# Patient Record
Sex: MALE | Race: WHITE | URBAN - METROPOLITAN AREA
[De-identification: names, ages, dates, MRNs, and addresses within clinical notes are randomized per-mention and may not be internally consistent; named-entity substitution may affect disease eponyms.]

---

## 2024-01-19 ENCOUNTER — TRANSCRIBE ORDERS (OUTPATIENT)
Dept: ADMINISTRATIVE | Facility: HOSPITAL | Age: 64
End: 2024-01-19

## 2024-01-19 DIAGNOSIS — Z13.6 SCREENING FOR ISCHEMIC HEART DISEASE: Primary | ICD-10-CM

## 2024-02-27 ENCOUNTER — HOSPITAL ENCOUNTER (OUTPATIENT)
Dept: CT IMAGING | Facility: HOSPITAL | Age: 64
Discharge: HOME | End: 2024-02-27
Admitting: STUDENT IN AN ORGANIZED HEALTH CARE EDUCATION/TRAINING PROGRAM

## 2024-02-27 DIAGNOSIS — Z13.6 SCREENING FOR ISCHEMIC HEART DISEASE: ICD-10-CM

## 2024-02-27 PROCEDURE — 75571 CT HRT W/O DYE W/CA TEST: CPT

## 2024-11-01 ENCOUNTER — OFFICE (AMBULATORY)
Dept: URBAN - METROPOLITAN AREA PATHOLOGY 19 | Facility: PATHOLOGY | Age: 64
End: 2024-11-01
Payer: MEDICARE

## 2024-11-01 ENCOUNTER — OFFICE (AMBULATORY)
Age: 64
End: 2024-11-01

## 2024-11-01 ENCOUNTER — ON CAMPUS - OUTPATIENT (AMBULATORY)
Dept: URBAN - METROPOLITAN AREA HOSPITAL 2 | Facility: HOSPITAL | Age: 64
End: 2024-11-01
Payer: MEDICARE

## 2024-11-01 ENCOUNTER — ON CAMPUS - OUTPATIENT (AMBULATORY)
Age: 64
End: 2024-11-01

## 2024-11-01 VITALS
SYSTOLIC BLOOD PRESSURE: 144 MMHG | SYSTOLIC BLOOD PRESSURE: 101 MMHG | HEART RATE: 66 BPM | SYSTOLIC BLOOD PRESSURE: 151 MMHG | DIASTOLIC BLOOD PRESSURE: 70 MMHG | HEART RATE: 69 BPM | DIASTOLIC BLOOD PRESSURE: 106 MMHG | RESPIRATION RATE: 16 BRPM | HEART RATE: 66 BPM | HEART RATE: 73 BPM | SYSTOLIC BLOOD PRESSURE: 155 MMHG | OXYGEN SATURATION: 99 % | HEART RATE: 73 BPM | HEART RATE: 65 BPM | DIASTOLIC BLOOD PRESSURE: 93 MMHG | RESPIRATION RATE: 18 BRPM | DIASTOLIC BLOOD PRESSURE: 92 MMHG | SYSTOLIC BLOOD PRESSURE: 139 MMHG | HEIGHT: 68 IN | OXYGEN SATURATION: 99 % | SYSTOLIC BLOOD PRESSURE: 148 MMHG | SYSTOLIC BLOOD PRESSURE: 101 MMHG | HEART RATE: 72 BPM | SYSTOLIC BLOOD PRESSURE: 148 MMHG | HEIGHT: 68 IN | DIASTOLIC BLOOD PRESSURE: 106 MMHG | DIASTOLIC BLOOD PRESSURE: 93 MMHG | HEART RATE: 71 BPM | HEART RATE: 65 BPM | HEART RATE: 72 BPM | DIASTOLIC BLOOD PRESSURE: 88 MMHG | DIASTOLIC BLOOD PRESSURE: 88 MMHG | WEIGHT: 213 LBS | OXYGEN SATURATION: 97 % | SYSTOLIC BLOOD PRESSURE: 144 MMHG | SYSTOLIC BLOOD PRESSURE: 143 MMHG | TEMPERATURE: 97.5 F | SYSTOLIC BLOOD PRESSURE: 151 MMHG | RESPIRATION RATE: 18 BRPM | SYSTOLIC BLOOD PRESSURE: 155 MMHG | DIASTOLIC BLOOD PRESSURE: 70 MMHG | SYSTOLIC BLOOD PRESSURE: 152 MMHG | SYSTOLIC BLOOD PRESSURE: 152 MMHG | DIASTOLIC BLOOD PRESSURE: 103 MMHG | HEART RATE: 88 BPM | DIASTOLIC BLOOD PRESSURE: 92 MMHG | HEART RATE: 88 BPM | HEART RATE: 71 BPM | OXYGEN SATURATION: 97 % | WEIGHT: 213 LBS | SYSTOLIC BLOOD PRESSURE: 139 MMHG | HEART RATE: 69 BPM | SYSTOLIC BLOOD PRESSURE: 143 MMHG | DIASTOLIC BLOOD PRESSURE: 103 MMHG | OXYGEN SATURATION: 100 % | RESPIRATION RATE: 16 BRPM | TEMPERATURE: 97.5 F | OXYGEN SATURATION: 100 %

## 2024-11-01 DIAGNOSIS — D12.0 BENIGN NEOPLASM OF CECUM: ICD-10-CM

## 2024-11-01 DIAGNOSIS — Z12.11 ENCOUNTER FOR SCREENING FOR MALIGNANT NEOPLASM OF COLON: ICD-10-CM

## 2024-11-01 DIAGNOSIS — K57.30 DIVERTICULOSIS OF LARGE INTESTINE WITHOUT PERFORATION OR ABS: ICD-10-CM

## 2024-11-01 DIAGNOSIS — K63.5 POLYP OF COLON: ICD-10-CM

## 2024-11-01 LAB
GI HISTOLOGY: A. CECUM: (no result)
GI HISTOLOGY: A. CECUM: (no result)
GI HISTOLOGY: B. SIGMOID COLON: (no result)
GI HISTOLOGY: B. SIGMOID COLON: (no result)
GI HISTOLOGY: PDF REPORT: (no result)
GI HISTOLOGY: PDF REPORT: (no result)

## 2024-11-01 PROCEDURE — 45385 COLONOSCOPY W/LESION REMOVAL: CPT | Mod: PT | Performed by: INTERNAL MEDICINE

## 2024-11-01 PROCEDURE — 88305 TISSUE EXAM BY PATHOLOGIST: CPT | Mod: 26 | Performed by: PATHOLOGY

## 2024-11-02 ENCOUNTER — APPOINTMENT (OUTPATIENT)
Dept: CT IMAGING | Facility: HOSPITAL | Age: 64
End: 2024-11-02
Payer: MEDICARE

## 2024-11-02 ENCOUNTER — APPOINTMENT (OUTPATIENT)
Dept: GENERAL RADIOLOGY | Facility: HOSPITAL | Age: 64
End: 2024-11-02
Payer: MEDICARE

## 2024-11-02 ENCOUNTER — HOSPITAL ENCOUNTER (INPATIENT)
Facility: HOSPITAL | Age: 64
LOS: 5 days | Discharge: HOME | End: 2024-11-07
Attending: EMERGENCY MEDICINE
Payer: MEDICARE

## 2024-11-02 DIAGNOSIS — K62.5 RECTAL BLEEDING: ICD-10-CM

## 2024-11-02 DIAGNOSIS — R55 SYNCOPE, UNSPECIFIED SYNCOPE TYPE: Primary | ICD-10-CM

## 2024-11-02 PROBLEM — K91.840: Status: ACTIVE | Noted: 2024-11-02

## 2024-11-02 PROBLEM — M06.9: Status: ACTIVE | Noted: 2024-11-02

## 2024-11-02 LAB
ABO GROUP BLD: NORMAL
ANION GAP SERPL CALCULATED.3IONS-SCNC: 11.5 MMOL/L (ref 5–15)
APTT PPP: 23.3 SECONDS (ref 61–76.5)
BASOPHILS # BLD AUTO: 0.05 10*3/MM3 (ref 0–0.2)
BASOPHILS NFR BLD AUTO: 0.4 % (ref 0–1.5)
BLD GP AB SCN SERPL QL: NEGATIVE
BUN SERPL-MCNC: 23 MG/DL (ref 8–23)
BUN/CREAT SERPL: 18.7 (ref 7–25)
CALCIUM SPEC-SCNC: 8.4 MG/DL (ref 8.6–10.5)
CHLORIDE SERPL-SCNC: 98 MMOL/L (ref 98–107)
CO2 SERPL-SCNC: 24.5 MMOL/L (ref 22–29)
CREAT SERPL-MCNC: 1.23 MG/DL (ref 0.76–1.27)
DEPRECATED RDW RBC AUTO: 42.5 FL (ref 37–54)
EGFRCR SERPLBLD CKD-EPI 2021: 65.2 ML/MIN/1.73
EOSINOPHIL # BLD AUTO: 0.15 10*3/MM3 (ref 0–0.4)
EOSINOPHIL NFR BLD AUTO: 1.1 % (ref 0.3–6.2)
ERYTHROCYTE [DISTWIDTH] IN BLOOD BY AUTOMATED COUNT: 13.2 % (ref 12.3–15.4)
GLUCOSE SERPL-MCNC: 123 MG/DL (ref 65–99)
HCT VFR BLD AUTO: 31.7 % (ref 37.5–51)
HGB BLD-MCNC: 11.1 G/DL (ref 13–17.7)
IMM GRANULOCYTES # BLD AUTO: 0.14 10*3/MM3 (ref 0–0.05)
IMM GRANULOCYTES NFR BLD AUTO: 1 % (ref 0–0.5)
INR PPP: 1.02 (ref 0.93–1.1)
LYMPHOCYTES # BLD AUTO: 2.58 10*3/MM3 (ref 0.7–3.1)
LYMPHOCYTES NFR BLD AUTO: 18.6 % (ref 19.6–45.3)
MCH RBC QN AUTO: 30.9 PG (ref 26.6–33)
MCHC RBC AUTO-ENTMCNC: 35 G/DL (ref 31.5–35.7)
MCV RBC AUTO: 88.3 FL (ref 79–97)
MONOCYTES # BLD AUTO: 0.95 10*3/MM3 (ref 0.1–0.9)
MONOCYTES NFR BLD AUTO: 6.8 % (ref 5–12)
NEUTROPHILS NFR BLD AUTO: 10.01 10*3/MM3 (ref 1.7–7)
NEUTROPHILS NFR BLD AUTO: 72.1 % (ref 42.7–76)
NRBC BLD AUTO-RTO: 0 /100 WBC (ref 0–0.2)
PLATELET # BLD AUTO: 315 10*3/MM3 (ref 140–450)
PMV BLD AUTO: 9.9 FL (ref 6–12)
POTASSIUM SERPL-SCNC: 3.6 MMOL/L (ref 3.5–5.2)
PROTHROMBIN TIME: 11.1 SECONDS (ref 9.6–11.7)
RBC # BLD AUTO: 3.59 10*6/MM3 (ref 4.14–5.8)
RH BLD: POSITIVE
SODIUM SERPL-SCNC: 134 MMOL/L (ref 136–145)
T&S EXPIRATION DATE: NORMAL
TROPONIN T SERPL HS-MCNC: 9 NG/L
WBC NRBC COR # BLD AUTO: 13.88 10*3/MM3 (ref 3.4–10.8)

## 2024-11-02 PROCEDURE — 74176 CT ABD & PELVIS W/O CONTRAST: CPT

## 2024-11-02 PROCEDURE — 80048 BASIC METABOLIC PNL TOTAL CA: CPT | Performed by: EMERGENCY MEDICINE

## 2024-11-02 PROCEDURE — 86901 BLOOD TYPING SEROLOGIC RH(D): CPT

## 2024-11-02 PROCEDURE — 85610 PROTHROMBIN TIME: CPT | Performed by: EMERGENCY MEDICINE

## 2024-11-02 PROCEDURE — 85025 COMPLETE CBC W/AUTO DIFF WBC: CPT | Performed by: EMERGENCY MEDICINE

## 2024-11-02 PROCEDURE — 86901 BLOOD TYPING SEROLOGIC RH(D): CPT | Performed by: EMERGENCY MEDICINE

## 2024-11-02 PROCEDURE — 84484 ASSAY OF TROPONIN QUANT: CPT | Performed by: EMERGENCY MEDICINE

## 2024-11-02 PROCEDURE — 36415 COLL VENOUS BLD VENIPUNCTURE: CPT

## 2024-11-02 PROCEDURE — 86850 RBC ANTIBODY SCREEN: CPT | Performed by: EMERGENCY MEDICINE

## 2024-11-02 PROCEDURE — 93005 ELECTROCARDIOGRAM TRACING: CPT

## 2024-11-02 PROCEDURE — 99285 EMERGENCY DEPT VISIT HI MDM: CPT

## 2024-11-02 PROCEDURE — 85027 COMPLETE CBC AUTOMATED: CPT

## 2024-11-02 PROCEDURE — 93005 ELECTROCARDIOGRAM TRACING: CPT | Performed by: EMERGENCY MEDICINE

## 2024-11-02 PROCEDURE — 85730 THROMBOPLASTIN TIME PARTIAL: CPT | Performed by: EMERGENCY MEDICINE

## 2024-11-02 PROCEDURE — 86923 COMPATIBILITY TEST ELECTRIC: CPT

## 2024-11-02 PROCEDURE — 86900 BLOOD TYPING SEROLOGIC ABO: CPT

## 2024-11-02 PROCEDURE — 25810000003 SODIUM CHLORIDE 0.9 % SOLUTION

## 2024-11-02 PROCEDURE — 71045 X-RAY EXAM CHEST 1 VIEW: CPT

## 2024-11-02 PROCEDURE — 86900 BLOOD TYPING SEROLOGIC ABO: CPT | Performed by: EMERGENCY MEDICINE

## 2024-11-02 RX ORDER — SODIUM CHLORIDE 9 MG/ML
150 INJECTION, SOLUTION INTRAVENOUS CONTINUOUS
Status: DISPENSED | OUTPATIENT
Start: 2024-11-02 | End: 2024-11-03

## 2024-11-02 RX ORDER — IBUPROFEN 600 MG/1
1 TABLET ORAL
Status: DISCONTINUED | OUTPATIENT
Start: 2024-11-02 | End: 2024-11-07 | Stop reason: HOSPADM

## 2024-11-02 RX ORDER — SODIUM CHLORIDE 0.9 % (FLUSH) 0.9 %
10 SYRINGE (ML) INJECTION AS NEEDED
Status: DISCONTINUED | OUTPATIENT
Start: 2024-11-02 | End: 2024-11-07 | Stop reason: HOSPADM

## 2024-11-02 RX ORDER — SODIUM CHLORIDE 0.9 % (FLUSH) 0.9 %
10 SYRINGE (ML) INJECTION EVERY 12 HOURS SCHEDULED
Status: DISCONTINUED | OUTPATIENT
Start: 2024-11-02 | End: 2024-11-07 | Stop reason: HOSPADM

## 2024-11-02 RX ORDER — ACETAMINOPHEN 325 MG/1
650 TABLET ORAL EVERY 4 HOURS PRN
Status: DISCONTINUED | OUTPATIENT
Start: 2024-11-02 | End: 2024-11-07 | Stop reason: HOSPADM

## 2024-11-02 RX ORDER — DEXTROSE MONOHYDRATE 25 G/50ML
25 INJECTION, SOLUTION INTRAVENOUS
Status: DISCONTINUED | OUTPATIENT
Start: 2024-11-02 | End: 2024-11-07 | Stop reason: HOSPADM

## 2024-11-02 RX ORDER — NICOTINE POLACRILEX 4 MG
15 LOZENGE BUCCAL
Status: DISCONTINUED | OUTPATIENT
Start: 2024-11-02 | End: 2024-11-07 | Stop reason: HOSPADM

## 2024-11-02 RX ORDER — NITROGLYCERIN 0.4 MG/1
0.4 TABLET SUBLINGUAL
Status: DISCONTINUED | OUTPATIENT
Start: 2024-11-02 | End: 2024-11-07 | Stop reason: HOSPADM

## 2024-11-02 RX ORDER — SODIUM CHLORIDE 9 MG/ML
40 INJECTION, SOLUTION INTRAVENOUS AS NEEDED
Status: DISCONTINUED | OUTPATIENT
Start: 2024-11-02 | End: 2024-11-07 | Stop reason: HOSPADM

## 2024-11-02 RX ORDER — PANTOPRAZOLE SODIUM 40 MG/10ML
40 INJECTION, POWDER, LYOPHILIZED, FOR SOLUTION INTRAVENOUS EVERY 12 HOURS SCHEDULED
Status: DISCONTINUED | OUTPATIENT
Start: 2024-11-02 | End: 2024-11-06

## 2024-11-02 RX ORDER — ACETAMINOPHEN 160 MG/5ML
650 SOLUTION ORAL EVERY 4 HOURS PRN
Status: DISCONTINUED | OUTPATIENT
Start: 2024-11-02 | End: 2024-11-07 | Stop reason: HOSPADM

## 2024-11-02 RX ORDER — ACETAMINOPHEN 650 MG/1
650 SUPPOSITORY RECTAL EVERY 4 HOURS PRN
Status: DISCONTINUED | OUTPATIENT
Start: 2024-11-02 | End: 2024-11-07 | Stop reason: HOSPADM

## 2024-11-02 RX ADMIN — SODIUM CHLORIDE 1000 ML: 0.9 INJECTION, SOLUTION INTRAVENOUS at 21:12

## 2024-11-02 RX ADMIN — PANTOPRAZOLE SODIUM 40 MG: 40 INJECTION, POWDER, FOR SOLUTION INTRAVENOUS at 22:33

## 2024-11-02 RX ADMIN — Medication 10 ML: at 22:34

## 2024-11-02 RX ADMIN — SODIUM CHLORIDE 150 ML/HR: 9 INJECTION, SOLUTION INTRAVENOUS at 21:53

## 2024-11-02 NOTE — Clinical Note
Level of Care: Telemetry [5]   Admitting Physician: ADENIKE ZAPIEN [[id number]]   Attending Physician: ADENIKE ZAPIEN [[id number]]

## 2024-11-02 NOTE — ED NOTES
"Chief Complaint   Patient presents with    Syncope     Pt states that he's here r/t bleeding from rectum. Pt states that he had a colonoscopy yesterday around 11:30am and is experiencing \"a lot of bleeding\". Pt's wife also states that pt experienced once episode of where pt was lightheaded, dizzy and sweaty.   "

## 2024-11-02 NOTE — ED PROVIDER NOTES
"Subjective   History of Present Illness  Chief complaint: Syncope    63-year-old male presents after syncopal episode.  Patient states he was sitting at his table at home when he started feeling woozy and hot.  The next thing he knew he was in the ambulance.  Family states he became diaphoretic and was not responding appropriately.  He denies any chest pain or shortness of breath.  He denies any focal numbness or weakness.  He had a colonoscopy yesterday and had some polyps removed.  He states he was told to expect some bleeding because of this but he states he has been bleeding quite a bit from his rectum since the procedure.  He does not take any blood thinners.  He reports minimal abdominal pain.    History provided by:  Patient      Review of Systems   Constitutional:  Negative for fever.   HENT:  Negative for congestion.    Respiratory:  Negative for cough and shortness of breath.    Cardiovascular:  Negative for chest pain.   Gastrointestinal:  Positive for abdominal pain and blood in stool. Negative for diarrhea and vomiting.   Musculoskeletal:  Negative for back pain.   Neurological:  Positive for syncope. Negative for headaches.   Psychiatric/Behavioral:  Negative for confusion.        No past medical history on file.    No Known Allergies    No past surgical history on file.    No family history on file.    Social History     Socioeconomic History    Marital status:        /81   Pulse 74   Temp 98.2 °F (36.8 °C)   Resp 16   Ht 177.8 cm (70\")   Wt 97.5 kg (215 lb)   SpO2 99%   BMI 30.85 kg/m²       Objective   Physical Exam  Vitals and nursing note reviewed.   Constitutional:       Appearance: Normal appearance.   HENT:      Head: Normocephalic and atraumatic.      Mouth/Throat:      Mouth: Mucous membranes are moist.   Cardiovascular:      Rate and Rhythm: Normal rate and regular rhythm.      Heart sounds: Normal heart sounds.   Pulmonary:      Effort: Pulmonary effort is normal. No " respiratory distress.      Breath sounds: Normal breath sounds.   Abdominal:      Palpations: Abdomen is soft.      Tenderness: There is no abdominal tenderness.   Musculoskeletal:         General: No deformity or signs of injury.   Skin:     General: Skin is warm and dry.   Neurological:      General: No focal deficit present.      Mental Status: He is alert and oriented to person, place, and time.         Procedures           ED Course      My interpretation of EKG shows sinus rhythm, rate of 82, left bundle branch block, I have no EKG for comparison                             Results for orders placed or performed during the hospital encounter of 11/02/24   ECG 12 Lead Syncope    Collection Time: 11/02/24  6:08 PM   Result Value Ref Range    QT Interval 431 ms    QTC Interval 504 ms   Basic Metabolic Panel    Collection Time: 11/02/24  6:52 PM    Specimen: Blood   Result Value Ref Range    Glucose 123 (H) 65 - 99 mg/dL    BUN 23 8 - 23 mg/dL    Creatinine 1.23 0.76 - 1.27 mg/dL    Sodium 134 (L) 136 - 145 mmol/L    Potassium 3.6 3.5 - 5.2 mmol/L    Chloride 98 98 - 107 mmol/L    CO2 24.5 22.0 - 29.0 mmol/L    Calcium 8.4 (L) 8.6 - 10.5 mg/dL    BUN/Creatinine Ratio 18.7 7.0 - 25.0    Anion Gap 11.5 5.0 - 15.0 mmol/L    eGFR 65.2 >60.0 mL/min/1.73   Protime-INR    Collection Time: 11/02/24  6:52 PM    Specimen: Blood   Result Value Ref Range    Protime 11.1 9.6 - 11.7 Seconds    INR 1.02 0.93 - 1.10   aPTT    Collection Time: 11/02/24  6:52 PM    Specimen: Blood   Result Value Ref Range    PTT 23.3 (L) 61.0 - 76.5 seconds   Single High Sensitivity Troponin T    Collection Time: 11/02/24  6:52 PM    Specimen: Blood   Result Value Ref Range    HS Troponin T 9 <22 ng/L   CBC Auto Differential    Collection Time: 11/02/24  6:52 PM    Specimen: Blood   Result Value Ref Range    WBC 13.88 (H) 3.40 - 10.80 10*3/mm3    RBC 3.59 (L) 4.14 - 5.80 10*6/mm3    Hemoglobin 11.1 (L) 13.0 - 17.7 g/dL    Hematocrit 31.7 (L) 37.5 -  51.0 %    MCV 88.3 79.0 - 97.0 fL    MCH 30.9 26.6 - 33.0 pg    MCHC 35.0 31.5 - 35.7 g/dL    RDW 13.2 12.3 - 15.4 %    RDW-SD 42.5 37.0 - 54.0 fl    MPV 9.9 6.0 - 12.0 fL    Platelets 315 140 - 450 10*3/mm3    Neutrophil % 72.1 42.7 - 76.0 %    Lymphocyte % 18.6 (L) 19.6 - 45.3 %    Monocyte % 6.8 5.0 - 12.0 %    Eosinophil % 1.1 0.3 - 6.2 %    Basophil % 0.4 0.0 - 1.5 %    Immature Grans % 1.0 (H) 0.0 - 0.5 %    Neutrophils, Absolute 10.01 (H) 1.70 - 7.00 10*3/mm3    Lymphocytes, Absolute 2.58 0.70 - 3.10 10*3/mm3    Monocytes, Absolute 0.95 (H) 0.10 - 0.90 10*3/mm3    Eosinophils, Absolute 0.15 0.00 - 0.40 10*3/mm3    Basophils, Absolute 0.05 0.00 - 0.20 10*3/mm3    Immature Grans, Absolute 0.14 (H) 0.00 - 0.05 10*3/mm3    nRBC 0.0 0.0 - 0.2 /100 WBC     XR Chest 1 View    Result Date: 11/2/2024  Impression: No active pulmonary process. Electronically Signed: Joshua Perez MD  11/2/2024 7:18 PM EDT  Workstation ID: [id number]               Medical Decision Making    Patient had the above valuation.  Results were discussed with the patient.  My interpretation of chest x-ray shows no infiltrate or effusion.  EKG shows left bundle branch block.  There is no previous EKG for comparison.  Patient is having no chest pain.  Troponin is negative.  White blood cell count is 13.88.  Hemoglobin is 11.1.  This has trended down from 3 months ago when it was 14.0.  BMP is unremarkable.  Patient has remained hemodynamically stable in the emergency room.  I discussed with the hospitalist and the patient will be admitted for further evaluation and management.  Syncope      Final diagnoses:   Syncope, unspecified syncope type   Rectal bleeding       ED Disposition  ED Disposition       ED Disposition   Decision to Admit    Condition   --    Comment   Level of Care: Telemetry [5]   Admitting Physician: ADENIKE ZAPIEN [[id number]]   Attending Physician: ADENIKE ZAPIEN [[id number]]                 No follow-up provider specified.        Medication List      No changes were made to your prescriptions during this visit.            William Combs MD  11/1960

## 2024-11-03 ENCOUNTER — INPATIENT HOSPITAL (AMBULATORY)
Age: 64
End: 2024-11-03
Payer: COMMERCIAL

## 2024-11-03 ENCOUNTER — ANESTHESIA EVENT (OUTPATIENT)
Dept: GASTROENTEROLOGY | Facility: HOSPITAL | Age: 64
End: 2024-11-03
Payer: MEDICARE

## 2024-11-03 ENCOUNTER — INPATIENT HOSPITAL (AMBULATORY)
Dept: URBAN - METROPOLITAN AREA HOSPITAL 84 | Facility: HOSPITAL | Age: 64
End: 2024-11-03
Payer: COMMERCIAL

## 2024-11-03 ENCOUNTER — ANESTHESIA (OUTPATIENT)
Dept: GASTROENTEROLOGY | Facility: HOSPITAL | Age: 64
End: 2024-11-03
Payer: MEDICARE

## 2024-11-03 VITALS — DIASTOLIC BLOOD PRESSURE: 64 MMHG | SYSTOLIC BLOOD PRESSURE: 117 MMHG | OXYGEN SATURATION: 99 % | HEART RATE: 101 BPM

## 2024-11-03 DIAGNOSIS — D64.9 ANEMIA, UNSPECIFIED: ICD-10-CM

## 2024-11-03 DIAGNOSIS — K57.30 DIVERTICULOSIS OF LARGE INTESTINE WITHOUT PERFORATION OR ABS: ICD-10-CM

## 2024-11-03 DIAGNOSIS — K63.3 ULCER OF INTESTINE: ICD-10-CM

## 2024-11-03 DIAGNOSIS — K92.2 GASTROINTESTINAL HEMORRHAGE, UNSPECIFIED: ICD-10-CM

## 2024-11-03 LAB
ALBUMIN SERPL-MCNC: 3.4 G/DL (ref 3.5–5.2)
ALBUMIN/GLOB SERPL: 2.1 G/DL
ALP SERPL-CCNC: 48 U/L (ref 39–117)
ALT SERPL W P-5'-P-CCNC: 17 U/L (ref 1–41)
ANION GAP SERPL CALCULATED.3IONS-SCNC: 8.4 MMOL/L (ref 5–15)
ARTERIAL PATENCY WRIST A: POSITIVE
AST SERPL-CCNC: 18 U/L (ref 1–40)
ATMOSPHERIC PRESS: ABNORMAL MM[HG]
BASE EXCESS BLDA CALC-SCNC: -5.1 MMOL/L (ref 0–3)
BASOPHILS # BLD AUTO: 0.02 10*3/MM3 (ref 0–0.2)
BASOPHILS NFR BLD AUTO: 0.1 % (ref 0–1.5)
BASOPHILS NFR BLD AUTO: 0.2 % (ref 0–1.5)
BASOPHILS NFR BLD AUTO: 0.2 % (ref 0–1.5)
BDY SITE: ABNORMAL
BILIRUB SERPL-MCNC: 0.9 MG/DL (ref 0–1.2)
BUN SERPL-MCNC: 23 MG/DL (ref 8–23)
BUN/CREAT SERPL: 20.7 (ref 7–25)
CA-I SERPL ISE-MCNC: 1.11 MMOL/L (ref 1.15–1.3)
CALCIUM SPEC-SCNC: 8.8 MG/DL (ref 8.6–10.5)
CHLORIDE SERPL-SCNC: 107 MMOL/L (ref 98–107)
CO2 BLDA-SCNC: 20.6 MMOL/L (ref 22–29)
CO2 SERPL-SCNC: 26.6 MMOL/L (ref 22–29)
CREAT SERPL-MCNC: 1.11 MG/DL (ref 0.76–1.27)
D-LACTATE SERPL-SCNC: 1.5 MMOL/L (ref 0.2–2)
DEPRECATED RDW RBC AUTO: 43.5 FL (ref 37–54)
DEPRECATED RDW RBC AUTO: 43.7 FL (ref 37–54)
DEPRECATED RDW RBC AUTO: 44.8 FL (ref 37–54)
DEPRECATED RDW RBC AUTO: 46.5 FL (ref 37–54)
DEPRECATED RDW RBC AUTO: 46.6 FL (ref 37–54)
EGFRCR SERPLBLD CKD-EPI 2021: 73.7 ML/MIN/1.73
EOSINOPHIL # BLD AUTO: 0.01 10*3/MM3 (ref 0–0.4)
EOSINOPHIL # BLD AUTO: 0.02 10*3/MM3 (ref 0–0.4)
EOSINOPHIL # BLD AUTO: 0.02 10*3/MM3 (ref 0–0.4)
EOSINOPHIL NFR BLD AUTO: 0.1 % (ref 0.3–6.2)
EOSINOPHIL NFR BLD AUTO: 0.2 % (ref 0.3–6.2)
EOSINOPHIL NFR BLD AUTO: 0.2 % (ref 0.3–6.2)
ERYTHROCYTE [DISTWIDTH] IN BLOOD BY AUTOMATED COUNT: 13.3 % (ref 12.3–15.4)
ERYTHROCYTE [DISTWIDTH] IN BLOOD BY AUTOMATED COUNT: 13.5 % (ref 12.3–15.4)
ERYTHROCYTE [DISTWIDTH] IN BLOOD BY AUTOMATED COUNT: 13.6 % (ref 12.3–15.4)
ERYTHROCYTE [DISTWIDTH] IN BLOOD BY AUTOMATED COUNT: 13.8 % (ref 12.3–15.4)
ERYTHROCYTE [DISTWIDTH] IN BLOOD BY AUTOMATED COUNT: 13.8 % (ref 12.3–15.4)
FIBRINOGEN PPP-MCNC: 195 MG/DL (ref 210–450)
GLOBULIN UR ELPH-MCNC: 1.6 GM/DL
GLUCOSE BLDC GLUCOMTR-MCNC: 132 MG/DL (ref 70–105)
GLUCOSE BLDC GLUCOMTR-MCNC: 211 MG/DL (ref 74–100)
GLUCOSE SERPL-MCNC: 118 MG/DL (ref 65–99)
HCO3 BLDA-SCNC: 19.6 MMOL/L (ref 21–28)
HCT VFR BLD AUTO: 20.2 % (ref 37.5–51)
HCT VFR BLD AUTO: 21.3 % (ref 37.5–51)
HCT VFR BLD AUTO: 21.5 % (ref 37.5–51)
HCT VFR BLD AUTO: 27.6 % (ref 37.5–51)
HCT VFR BLD AUTO: 28.7 % (ref 37.5–51)
HCT VFR BLD AUTO: 29.9 % (ref 37.5–51)
HCT VFR BLDA CALC: 37 % (ref 38–51)
HEMODILUTION: NO
HGB BLD-MCNC: 10.1 G/DL (ref 13–17.7)
HGB BLD-MCNC: 10.4 G/DL (ref 13–17.7)
HGB BLD-MCNC: 6.8 G/DL (ref 13–17.7)
HGB BLD-MCNC: 7.1 G/DL (ref 13–17.7)
HGB BLD-MCNC: 7.1 G/DL (ref 13–17.7)
HGB BLD-MCNC: 9.4 G/DL (ref 13–17.7)
HGB BLDA-MCNC: 12.6 G/DL (ref 12–17)
IMM GRANULOCYTES # BLD AUTO: 0.14 10*3/MM3 (ref 0–0.05)
IMM GRANULOCYTES # BLD AUTO: 0.16 10*3/MM3 (ref 0–0.05)
IMM GRANULOCYTES # BLD AUTO: 0.23 10*3/MM3 (ref 0–0.05)
IMM GRANULOCYTES NFR BLD AUTO: 1.1 % (ref 0–0.5)
IMM GRANULOCYTES NFR BLD AUTO: 1.2 % (ref 0–0.5)
IMM GRANULOCYTES NFR BLD AUTO: 1.4 % (ref 0–0.5)
INHALED O2 CONCENTRATION: 21 %
IRON 24H UR-MRATE: 203 MCG/DL (ref 59–158)
IRON SATN MFR SERPL: 66 % (ref 20–50)
LYMPHOCYTES # BLD AUTO: 1.62 10*3/MM3 (ref 0.7–3.1)
LYMPHOCYTES # BLD AUTO: 1.65 10*3/MM3 (ref 0.7–3.1)
LYMPHOCYTES # BLD AUTO: 1.71 10*3/MM3 (ref 0.7–3.1)
LYMPHOCYTES NFR BLD AUTO: 12.5 % (ref 19.6–45.3)
LYMPHOCYTES NFR BLD AUTO: 13.2 % (ref 19.6–45.3)
LYMPHOCYTES NFR BLD AUTO: 9.8 % (ref 19.6–45.3)
MAGNESIUM SERPL-MCNC: 1.9 MG/DL (ref 1.6–2.4)
MCH RBC QN AUTO: 30.5 PG (ref 26.6–33)
MCH RBC QN AUTO: 30.5 PG (ref 26.6–33)
MCH RBC QN AUTO: 30.7 PG (ref 26.6–33)
MCH RBC QN AUTO: 30.8 PG (ref 26.6–33)
MCH RBC QN AUTO: 31.1 PG (ref 26.6–33)
MCHC RBC AUTO-ENTMCNC: 33 G/DL (ref 31.5–35.7)
MCHC RBC AUTO-ENTMCNC: 33.3 G/DL (ref 31.5–35.7)
MCHC RBC AUTO-ENTMCNC: 33.7 G/DL (ref 31.5–35.7)
MCHC RBC AUTO-ENTMCNC: 34.1 G/DL (ref 31.5–35.7)
MCHC RBC AUTO-ENTMCNC: 35.2 G/DL (ref 31.5–35.7)
MCV RBC AUTO: 86.7 FL (ref 79–97)
MCV RBC AUTO: 90.2 FL (ref 79–97)
MCV RBC AUTO: 91.4 FL (ref 79–97)
MCV RBC AUTO: 92.3 FL (ref 79–97)
MCV RBC AUTO: 93.4 FL (ref 79–97)
MODALITY: ABNORMAL
MONOCYTES # BLD AUTO: 0.6 10*3/MM3 (ref 0.1–0.9)
MONOCYTES # BLD AUTO: 0.63 10*3/MM3 (ref 0.1–0.9)
MONOCYTES # BLD AUTO: 1.38 10*3/MM3 (ref 0.1–0.9)
MONOCYTES NFR BLD AUTO: 4.5 % (ref 5–12)
MONOCYTES NFR BLD AUTO: 4.8 % (ref 5–12)
MONOCYTES NFR BLD AUTO: 8.3 % (ref 5–12)
NEUTROPHILS NFR BLD AUTO: 10.47 10*3/MM3 (ref 1.7–7)
NEUTROPHILS NFR BLD AUTO: 10.78 10*3/MM3 (ref 1.7–7)
NEUTROPHILS NFR BLD AUTO: 13.29 10*3/MM3 (ref 1.7–7)
NEUTROPHILS NFR BLD AUTO: 80.3 % (ref 42.7–76)
NEUTROPHILS NFR BLD AUTO: 80.5 % (ref 42.7–76)
NEUTROPHILS NFR BLD AUTO: 81.4 % (ref 42.7–76)
NRBC BLD AUTO-RTO: 0 /100 WBC (ref 0–0.2)
PCO2 BLDA: 34.4 MM HG (ref 35–48)
PH BLDA: 7.36 PH UNITS (ref 7.35–7.45)
PHOSPHATE SERPL-MCNC: 2.5 MG/DL (ref 2.5–4.5)
PLATELET # BLD AUTO: 155 10*3/MM3 (ref 140–450)
PLATELET # BLD AUTO: 252 10*3/MM3 (ref 140–450)
PLATELET # BLD AUTO: 265 10*3/MM3 (ref 140–450)
PLATELET # BLD AUTO: 270 10*3/MM3 (ref 140–450)
PLATELET # BLD AUTO: 323 10*3/MM3 (ref 140–450)
PMV BLD AUTO: 10.1 FL (ref 6–12)
PMV BLD AUTO: 10.4 FL (ref 6–12)
PMV BLD AUTO: 10.9 FL (ref 6–12)
PMV BLD AUTO: 11.1 FL (ref 6–12)
PMV BLD AUTO: 9.8 FL (ref 6–12)
PO2 BLD: 378 MM[HG] (ref 0–500)
PO2 BLDA: 79.4 MM HG (ref 83–108)
POTASSIUM SERPL-SCNC: 3.8 MMOL/L (ref 3.5–5.2)
PROT SERPL-MCNC: 5 G/DL (ref 6–8.5)
QT INTERVAL: 431 MS
QTC INTERVAL: 504 MS
RBC # BLD AUTO: 2.21 10*6/MM3 (ref 4.14–5.8)
RBC # BLD AUTO: 2.28 10*6/MM3 (ref 4.14–5.8)
RBC # BLD AUTO: 2.33 10*6/MM3 (ref 4.14–5.8)
RBC # BLD AUTO: 3.06 10*6/MM3 (ref 4.14–5.8)
RBC # BLD AUTO: 3.31 10*6/MM3 (ref 4.14–5.8)
SAO2 % BLDCOA: 95.3 % (ref 94–98)
SODIUM SERPL-SCNC: 142 MMOL/L (ref 136–145)
TIBC SERPL-MCNC: 308 MCG/DL (ref 298–536)
TRANSFERRIN SERPL-MCNC: 207 MG/DL (ref 200–360)
WBC NRBC COR # BLD AUTO: 12.92 10*3/MM3 (ref 3.4–10.8)
WBC NRBC COR # BLD AUTO: 12.99 10*3/MM3 (ref 3.4–10.8)
WBC NRBC COR # BLD AUTO: 13.23 10*3/MM3 (ref 3.4–10.8)
WBC NRBC COR # BLD AUTO: 15 10*3/MM3 (ref 3.4–10.8)
WBC NRBC COR # BLD AUTO: 16.55 10*3/MM3 (ref 3.4–10.8)

## 2024-11-03 PROCEDURE — P9100 PATHOGEN TEST FOR PLATELETS: HCPCS

## 2024-11-03 PROCEDURE — 82330 ASSAY OF CALCIUM: CPT | Performed by: NURSE PRACTITIONER

## 2024-11-03 PROCEDURE — 45382 COLONOSCOPY W/CONTROL BLEED: CPT | Performed by: INTERNAL MEDICINE

## 2024-11-03 PROCEDURE — 0DJD8ZZ INSPECTION OF LOWER INTESTINAL TRACT, VIA NATURAL OR ARTIFICIAL OPENING ENDOSCOPIC: ICD-10-PCS | Performed by: INTERNAL MEDICINE

## 2024-11-03 PROCEDURE — 86927 PLASMA FRESH FROZEN: CPT

## 2024-11-03 PROCEDURE — P9059 PLASMA, FRZ BETWEEN 8-24HOUR: HCPCS

## 2024-11-03 PROCEDURE — 85018 HEMOGLOBIN: CPT

## 2024-11-03 PROCEDURE — 85014 HEMATOCRIT: CPT | Performed by: NURSE PRACTITIONER

## 2024-11-03 PROCEDURE — 94799 UNLISTED PULMONARY SVC/PX: CPT

## 2024-11-03 PROCEDURE — 85670 THROMBIN TIME PLASMA: CPT | Performed by: NURSE PRACTITIONER

## 2024-11-03 PROCEDURE — 36600 WITHDRAWAL OF ARTERIAL BLOOD: CPT

## 2024-11-03 PROCEDURE — 02HV33Z INSERTION OF INFUSION DEVICE INTO SUPERIOR VENA CAVA, PERCUTANEOUS APPROACH: ICD-10-PCS | Performed by: HOSPITALIST

## 2024-11-03 PROCEDURE — 84100 ASSAY OF PHOSPHORUS: CPT | Performed by: NURSE PRACTITIONER

## 2024-11-03 PROCEDURE — 82803 BLOOD GASES ANY COMBINATION: CPT

## 2024-11-03 PROCEDURE — 83605 ASSAY OF LACTIC ACID: CPT

## 2024-11-03 PROCEDURE — 82948 REAGENT STRIP/BLOOD GLUCOSE: CPT

## 2024-11-03 PROCEDURE — 25010000002 EPINEPHRINE 1 MG/10ML SOLUTION PREFILLED SYRINGE: Performed by: INTERNAL MEDICINE

## 2024-11-03 PROCEDURE — 82330 ASSAY OF CALCIUM: CPT

## 2024-11-03 PROCEDURE — 86900 BLOOD TYPING SEROLOGIC ABO: CPT

## 2024-11-03 PROCEDURE — 84466 ASSAY OF TRANSFERRIN: CPT | Performed by: NURSE PRACTITIONER

## 2024-11-03 PROCEDURE — 83735 ASSAY OF MAGNESIUM: CPT | Performed by: NURSE PRACTITIONER

## 2024-11-03 PROCEDURE — P9035 PLATELET PHERES LEUKOREDUCED: HCPCS

## 2024-11-03 PROCEDURE — 85018 HEMOGLOBIN: CPT | Performed by: NURSE PRACTITIONER

## 2024-11-03 PROCEDURE — 83540 ASSAY OF IRON: CPT | Performed by: NURSE PRACTITIONER

## 2024-11-03 PROCEDURE — 25010000002 CALCIUM GLUCONATE 2-0.675 GM/100ML-% SOLUTION: Performed by: INTERNAL MEDICINE

## 2024-11-03 PROCEDURE — 80053 COMPREHEN METABOLIC PANEL: CPT | Performed by: NURSE PRACTITIONER

## 2024-11-03 PROCEDURE — 85025 COMPLETE CBC W/AUTO DIFF WBC: CPT | Performed by: NURSE PRACTITIONER

## 2024-11-03 PROCEDURE — 25010000002 ONDANSETRON PER 1 MG: Performed by: INTERNAL MEDICINE

## 2024-11-03 PROCEDURE — C1751 CATH, INF, PER/CENT/MIDLINE: HCPCS

## 2024-11-03 PROCEDURE — P9016 RBC LEUKOCYTES REDUCED: HCPCS

## 2024-11-03 PROCEDURE — 25010000002 PROPOFOL 10 MG/ML EMULSION: Performed by: ANESTHESIOLOGY

## 2024-11-03 PROCEDURE — P9012 CRYOPRECIPITATE EACH UNIT: HCPCS

## 2024-11-03 PROCEDURE — 36430 TRANSFUSION BLD/BLD COMPNT: CPT

## 2024-11-03 PROCEDURE — 25010000002 CALCIUM GLUCONATE 2-0.675 GM/100ML-% SOLUTION: Performed by: NURSE PRACTITIONER

## 2024-11-03 PROCEDURE — 85384 FIBRINOGEN ACTIVITY: CPT | Performed by: NURSE PRACTITIONER

## 2024-11-03 PROCEDURE — 25810000003 SODIUM CHLORIDE 0.9 % SOLUTION: Performed by: ANESTHESIOLOGY

## 2024-11-03 PROCEDURE — 85025 COMPLETE CBC W/AUTO DIFF WBC: CPT

## 2024-11-03 DEVICE — DEV CLIP HEMO RESOLUTION360/ULTR 235CM 17MM STRL: Type: IMPLANTABLE DEVICE | Site: CECUM | Status: FUNCTIONAL

## 2024-11-03 RX ORDER — SODIUM CHLORIDE 0.9 % (FLUSH) 0.9 %
10 SYRINGE (ML) INJECTION AS NEEDED
Status: DISCONTINUED | OUTPATIENT
Start: 2024-11-03 | End: 2024-11-04

## 2024-11-03 RX ORDER — PHENYLEPHRINE HCL IN 0.9% NACL 1 MG/10 ML
SYRINGE (ML) INTRAVENOUS AS NEEDED
Status: DISCONTINUED | OUTPATIENT
Start: 2024-11-03 | End: 2024-11-03 | Stop reason: SURG

## 2024-11-03 RX ORDER — CHLORAL HYDRATE 500 MG
1000 CAPSULE ORAL
COMMUNITY

## 2024-11-03 RX ORDER — SODIUM CHLORIDE 9 MG/ML
INJECTION, SOLUTION INTRAVENOUS CONTINUOUS PRN
Status: DISCONTINUED | OUTPATIENT
Start: 2024-11-03 | End: 2024-11-03 | Stop reason: SURG

## 2024-11-03 RX ORDER — PROPOFOL 10 MG/ML
VIAL (ML) INTRAVENOUS AS NEEDED
Status: DISCONTINUED | OUTPATIENT
Start: 2024-11-03 | End: 2024-11-03 | Stop reason: SURG

## 2024-11-03 RX ORDER — TOBRAMYCIN AND DEXAMETHASONE 3; 1 MG/ML; MG/ML
3-4 SUSPENSION/ DROPS OPHTHALMIC
COMMUNITY

## 2024-11-03 RX ORDER — SODIUM TETRADECYL SULFATE 30 MG/ML
INJECTION, SOLUTION INTRAVENOUS
Status: DISPENSED
Start: 2024-11-03 | End: 2024-11-03

## 2024-11-03 RX ORDER — HYDROCODONE BITARTRATE AND ACETAMINOPHEN 10; 325 MG/1; MG/1
1 TABLET ORAL 3 TIMES DAILY PRN
Status: DISCONTINUED | OUTPATIENT
Start: 2024-11-03 | End: 2024-11-04

## 2024-11-03 RX ORDER — AMLODIPINE BESYLATE 5 MG/1
10 TABLET ORAL
Status: DISCONTINUED | OUTPATIENT
Start: 2024-11-03 | End: 2024-11-07 | Stop reason: HOSPADM

## 2024-11-03 RX ORDER — ATORVASTATIN CALCIUM 10 MG/1
10 TABLET, FILM COATED ORAL DAILY
Status: DISCONTINUED | OUTPATIENT
Start: 2024-11-03 | End: 2024-11-07 | Stop reason: HOSPADM

## 2024-11-03 RX ORDER — SODIUM CHLORIDE 0.9 % (FLUSH) 0.9 %
10 SYRINGE (ML) INJECTION EVERY 12 HOURS SCHEDULED
Status: DISCONTINUED | OUTPATIENT
Start: 2024-11-03 | End: 2024-11-04

## 2024-11-03 RX ORDER — GABAPENTIN 300 MG/1
600 CAPSULE ORAL EVERY 8 HOURS SCHEDULED
Status: DISCONTINUED | OUTPATIENT
Start: 2024-11-03 | End: 2024-11-07 | Stop reason: HOSPADM

## 2024-11-03 RX ORDER — NOREPINEPHRINE BITARTRATE 0.03 MG/ML
.02-.3 INJECTION, SOLUTION INTRAVENOUS
Status: DISCONTINUED | OUTPATIENT
Start: 2024-11-03 | End: 2024-11-04

## 2024-11-03 RX ORDER — HYDROCHLOROTHIAZIDE 12.5 MG/1
12.5 TABLET ORAL DAILY
COMMUNITY

## 2024-11-03 RX ORDER — SILDENAFIL 100 MG/1
100 TABLET, FILM COATED ORAL DAILY PRN
COMMUNITY

## 2024-11-03 RX ORDER — GABAPENTIN 600 MG/1
600 TABLET ORAL 4 TIMES DAILY
COMMUNITY

## 2024-11-03 RX ORDER — FLUTICASONE PROPIONATE 50 MCG
2 SPRAY, SUSPENSION (ML) NASAL DAILY
COMMUNITY

## 2024-11-03 RX ORDER — SODIUM, POTASSIUM,MAG SULFATES 17.5-3.13G
1 SOLUTION, RECONSTITUTED, ORAL ORAL EVERY 12 HOURS
Status: DISPENSED | OUTPATIENT
Start: 2024-11-03 | End: 2024-11-04

## 2024-11-03 RX ORDER — PHENYLEPHRINE HCL IN 0.9% NACL 1 MG/10 ML
SYRINGE (ML) INTRAVENOUS
Status: COMPLETED
Start: 2024-11-03 | End: 2024-11-03

## 2024-11-03 RX ORDER — LORATADINE 10 MG/1
10 CAPSULE, LIQUID FILLED ORAL DAILY
COMMUNITY

## 2024-11-03 RX ORDER — CALCIUM GLUCONATE 20 MG/ML
2000 INJECTION, SOLUTION INTRAVENOUS ONCE
Status: COMPLETED | OUTPATIENT
Start: 2024-11-03 | End: 2024-11-03

## 2024-11-03 RX ORDER — HYDROCODONE BITARTRATE AND ACETAMINOPHEN 10; 325 MG/1; MG/1
1 TABLET ORAL 3 TIMES DAILY PRN
COMMUNITY

## 2024-11-03 RX ORDER — AMLODIPINE AND VALSARTAN 10; 320 MG/1; MG/1
1 TABLET ORAL DAILY
COMMUNITY

## 2024-11-03 RX ORDER — HYDROCHLOROTHIAZIDE 12.5 MG/1
12.5 TABLET ORAL DAILY
Status: DISCONTINUED | OUTPATIENT
Start: 2024-11-03 | End: 2024-11-07 | Stop reason: HOSPADM

## 2024-11-03 RX ORDER — SODIUM CHLORIDE 0.9 % (FLUSH) 0.9 %
20 SYRINGE (ML) INJECTION AS NEEDED
Status: DISCONTINUED | OUTPATIENT
Start: 2024-11-03 | End: 2024-11-04

## 2024-11-03 RX ORDER — TOBRAMYCIN AND DEXAMETHASONE 3; 1 MG/ML; MG/ML
2 SUSPENSION/ DROPS OPHTHALMIC
Status: DISCONTINUED | OUTPATIENT
Start: 2024-11-03 | End: 2024-11-03

## 2024-11-03 RX ORDER — SODIUM CHLORIDE 9 MG/ML
40 INJECTION, SOLUTION INTRAVENOUS AS NEEDED
Status: DISCONTINUED | OUTPATIENT
Start: 2024-11-03 | End: 2024-11-04

## 2024-11-03 RX ORDER — VALSARTAN 80 MG/1
320 TABLET ORAL
Status: DISCONTINUED | OUTPATIENT
Start: 2024-11-03 | End: 2024-11-07 | Stop reason: HOSPADM

## 2024-11-03 RX ORDER — ONDANSETRON 2 MG/ML
4 INJECTION INTRAMUSCULAR; INTRAVENOUS EVERY 6 HOURS PRN
Status: DISCONTINUED | OUTPATIENT
Start: 2024-11-03 | End: 2024-11-07 | Stop reason: HOSPADM

## 2024-11-03 RX ADMIN — SODIUM CHLORIDE: 9 INJECTION, SOLUTION INTRAVENOUS at 09:46

## 2024-11-03 RX ADMIN — Medication 200 MCG: at 10:04

## 2024-11-03 RX ADMIN — Medication 100 MCG: at 09:59

## 2024-11-03 RX ADMIN — PROPOFOL 10 MG: 10 INJECTION, EMULSION INTRAVENOUS at 10:04

## 2024-11-03 RX ADMIN — Medication 100 MCG: at 10:26

## 2024-11-03 RX ADMIN — CALCIUM GLUCONATE 2000 MG: 20 INJECTION, SOLUTION INTRAVENOUS at 09:24

## 2024-11-03 RX ADMIN — Medication 100 MCG: at 10:12

## 2024-11-03 RX ADMIN — PROPOFOL 30 MG: 10 INJECTION, EMULSION INTRAVENOUS at 09:51

## 2024-11-03 RX ADMIN — PROPOFOL 10 MG: 10 INJECTION, EMULSION INTRAVENOUS at 10:09

## 2024-11-03 RX ADMIN — Medication 200 MCG: at 09:52

## 2024-11-03 RX ADMIN — Medication 200 MCG: at 10:19

## 2024-11-03 RX ADMIN — PROPOFOL 20 MG: 10 INJECTION, EMULSION INTRAVENOUS at 10:21

## 2024-11-03 RX ADMIN — Medication 200 MCG: at 10:05

## 2024-11-03 RX ADMIN — Medication 10 ML: at 08:10

## 2024-11-03 RX ADMIN — SODIUM SULFATE, POTASSIUM SULFATE, MAGNESIUM SULFATE 1 BOTTLE: 17.5; 3.13; 1.6 SOLUTION, CONCENTRATE ORAL at 05:02

## 2024-11-03 RX ADMIN — Medication 10 ML: at 20:16

## 2024-11-03 RX ADMIN — NOREPINEPHRINE BITARTRATE 0.02 MCG/KG/MIN: 0.03 INJECTION, SOLUTION INTRAVENOUS at 07:50

## 2024-11-03 RX ADMIN — Medication 100 MCG: at 10:07

## 2024-11-03 RX ADMIN — PANTOPRAZOLE SODIUM 40 MG: 40 INJECTION, POWDER, FOR SOLUTION INTRAVENOUS at 08:10

## 2024-11-03 RX ADMIN — Medication 200 MCG: at 09:49

## 2024-11-03 RX ADMIN — PROPOFOL 20 MG: 10 INJECTION, EMULSION INTRAVENOUS at 10:15

## 2024-11-03 RX ADMIN — PROPOFOL 20 MG: 10 INJECTION, EMULSION INTRAVENOUS at 10:00

## 2024-11-03 RX ADMIN — ONDANSETRON 4 MG: 2 INJECTION INTRAMUSCULAR; INTRAVENOUS at 09:45

## 2024-11-03 RX ADMIN — CALCIUM GLUCONATE 2000 MG: 20 INJECTION, SOLUTION INTRAVENOUS at 11:00

## 2024-11-03 RX ADMIN — PROPOFOL 10 MG: 10 INJECTION, EMULSION INTRAVENOUS at 10:29

## 2024-11-03 RX ADMIN — PANTOPRAZOLE SODIUM 40 MG: 40 INJECTION, POWDER, FOR SOLUTION INTRAVENOUS at 20:09

## 2024-11-03 RX ADMIN — Medication 100 MCG: at 10:29

## 2024-11-03 NOTE — CONSULTS
PICC Line Insertion Procedure Note    Procedure: Insertion of #5 FR/16G triple lumenPICC    Indications:  Massive transfusion, pressors    Active Time Out:  Correct patient: Yes  Correct procedure: Yes  Correct site: Yes  Verified with: Harry MEDLEY    Procedure Details:  Informed consent was obtained for the procedure.  Risk include, but are not limited to infection, air embolism, catheter tip moving, catheter blockage and phlebitis.     Maximum sterile technique was used including antiseptics, cap, gloves, gown, hand hygiene, mask, and sheet.    Ultrasound Guidance: Yes    #5 FR/16G PICC inserted to the R Basilic vein per hospital protocol by JASMYNE Osborne.   Non-pulsatile blood return: yes    Lot #: [id number]    Expiration date: 8/31/2025    Complications:  No immediate complications noted.    Findings:  Catheter inserted to 41 cm, with 0 cm exposed.   Mid upper arm circumference is 38.5 cm.   Catheter was flushed with 30 cc NS and sterile dressing applied.  Patient tolerated procedure well.  PICC tip verified by:       [x] Sapiens 3cg       [] Chest X-ray    Recommendations:  Verbal and/or written Care/Maintenance instructions provided to patient.   Primary nurse notified that the line is good to use at this time.    Morales Osborne RN  11/03/24  12:07 EST

## 2024-11-03 NOTE — CONSULTS
Critical Care Consult Note   Anup Fernandez : 1960 MRN:[id number] LOS:1 ROOM: 2303/1     Reason for admission: Syncope     Assessment / Plan     Hemorrhagic shock/acute blood loss anemia  Etiology: S/p polypectomy with presumed hemorrhage from that site  GI following closely, plan for urgent endoscopy today  Serial H&H, transfuse for hemoglobin <9 for now due to active bleed  Massive transfusion protocol initiated  Wean pressor support for a MAP goal of 65 mmHg    Acute hyponatremia  Likely secondary to volume loss  Continue to monitor, patient has received large crystalloid and blood products volume resuscitation    Essential Hypertension  Home medication regimen amlodipine/valsartan, hydrochlorothiazide--hold due to hypotension  Titrate medications as needed.    Hyperlipidemia  Home medication lovastatin, on hold due to n.p.o. status    Arthritis  Symptom management  Home medications gabapentin and OTC NSAIDs.  Currently n.p.o.        Code Status (Patient has no pulse and is not breathing): CPR (Attempt to Resuscitate)  Medical Interventions (Patient has pulse or is breathing): Full Support       Nutrition: NPO Diet NPO Type: Sips with Meds Patient isn't on Tube Feeding     VTE Prophylaxis:  Mechanical VTE prophylaxis orders are present.         History of Present illness     Mr. Fernandez is a 63-year-old gentleman with a past history of arthritis, hypertension, hyperlipidemia, GERD, diverticulitis, cervical spondylitis with myelopathy and radiculopathy s/p surgery and fixation.  On  the patient underwent a routine, outpatient colonoscopy during which time a polypectomy was performed.  He reports that he had no immediate complications however he'd had a small amount of rectal bleeding since the procedure which he had been told to expect due to the polypectomy.  Yesterday he developed shraddha rectal bleeding and passing some blood clots.  He reports that he became dizzy and lightheaded and sat down to relax.   "He reports that he then \"passed out\" in the chair without falling or injuring himself.  He has no recollection of the event and states the next thing he knew he was in an ambulance.  His wife was with him when he became unconscious and immediately called EMS and the patient was transported to the ED.  The patient states that he had had some cramping sensation in the lower abdomen and occasional more severe pain in the left lower quadrant.  He denies that he is had any nausea or vomiting.    In the ED, the patient's hemoglobin was noted to be 11.1 with a baseline of 14.  He had persistent rectal bleeding and GI was consulted with a plan for endoscopy this morning and bowel prep was initiated.  Within 5 hours the patient's hemoglobin had dropped from 11.1-9.4.  Early this morning the patient underwent a rapid response due to hypotension with an SBP as low as the 50s and his hemoglobin dropped to 6.8.  The patient received crystalloid fluid resuscitation as well as 1 unit PRBC transfusion.  He had initial recovery of his blood pressure however this morning he underwent another rapid response due to hypotension with a blood pressure of 60/30 and at that point his hemoglobin was 7.1.  He was transferred to the ICU for aggressive medical management and vasopressor support.  After transfer to the ICU the patient continued to have large, dark red blood per rectum which filled multiple bedpan's.  He continued to receive transfusions and ultimately the massive transfusion protocol was initiated.  GI is aware of these changes with plan for emergent colonoscopy soon as possible.    ACP: No ACP documentation on file. The patient's wife is his NOK and default alternate decision-maker    Patient was seen and examined on 11/03/24 at 07:56 EST .    Past Medical/Surgical/Social/Family History & Allergies     History reviewed. No pertinent past medical history.   Past Surgical History:   Procedure Laterality Date    COLONOSCOPY      "   Social History     Socioeconomic History    Marital status:    Tobacco Use    Smoking status: Former     Types: Cigarettes    Smokeless tobacco: Never   Vaping Use    Vaping status: Never Used   Substance and Sexual Activity    Alcohol use: Never    Drug use: Never    Sexual activity: Defer      History reviewed. No pertinent family history.   Allergies   Allergen Reactions    Penicillins Hives        Home Medications     Prior to Admission medications    Medication Sig Start Date End Date Taking? Authorizing Provider   amLODIPine-valsartan (Exforge)  MG per tablet Take 1 tablet by mouth Daily.   Yes Daniel Schultz MD   fluticasone (FLONASE) 50 MCG/ACT nasal spray Administer 2 sprays into the nostril(s) as directed by provider Daily.   Yes Daniel Schultz MD   gabapentin (NEURONTIN) 600 MG tablet Take 1 tablet by mouth 4 (Four) Times a Day.   Yes Daniel Schultz MD   hydroCHLOROthiazide 12.5 MG tablet Take 1 tablet by mouth Daily.   Yes Daniel Schultz MD   HYDROcodone-acetaminophen (NORCO)  MG per tablet Take 1 tablet by mouth 3 (Three) Times a Day As Needed for Moderate Pain.   Yes Daniel Schultz MD   Loratadine 10 MG capsule Take 1 capsule by mouth Daily.   Yes Daniel Schultz MD   lovastatin (ALTOPREV) 20 MG 24 hr tablet Take 1 tablet by mouth Every Night.   Yes Daniel Schultz MD   Omega-3 Fatty Acids (fish oil) 1000 MG capsule capsule Take 1 capsule by mouth Daily With Breakfast.   Yes Daniel Schultz MD   omeprazole (priLOSEC) 20 MG capsule Take 1 capsule by mouth Daily.   Yes Daniel Schultz MD   oxaprozin (DAYPRO) 600 MG tablet Take 2 tablets by mouth 2 (Two) Times a Day.   Yes Daniel Schultz MD   sildenafil (VIAGRA) 100 MG tablet Take 1 tablet by mouth Daily As Needed for Erectile Dysfunction.   Yes Daniel Schultz MD   tobramycin-dexAMETHasone (TOBRADEX) 0.3-0.1 % ophthalmic suspension Apply 3-4 drops to eye(s) as  directed by provider Every 4 (Four) Hours While Awake. 3-4 drops into both ears TID for 2 weeks   Then BID for 2 weeks  Then daily for 2 weeks   Yes Provider, MD Daniel        Objective / Physical Exam     Vital signs:  Temp: 98.5 °F (36.9 °C)  BP: (!) 66/32  Heart Rate: 92  Resp: 15  SpO2: 98 %  Weight: 80 kg (176 lb 5.9 oz)    Admission Weight: Weight: 97.5 kg (215 lb)    Physical Exam  Vitals and nursing note reviewed.   Constitutional:       General: He is not in acute distress.  HENT:      Head: Normocephalic and atraumatic.      Right Ear: External ear normal.      Left Ear: External ear normal.      Nose: Nose normal.      Mouth/Throat:      Mouth: Mucous membranes are moist.      Pharynx: Oropharynx is clear.      Comments: Gums pale  Dentition in good repair  Eyes:      General: No scleral icterus.     Pupils: Pupils are equal, round, and reactive to light.      Comments: Conjunctivae pale   Cardiovascular:      Heart sounds: Normal heart sounds, S1 normal and S2 normal. No murmur heard.     Comments: SR  Pulmonary:      Effort: Pulmonary effort is normal. No respiratory distress.      Breath sounds: Normal breath sounds. No wheezing or rhonchi.   Abdominal:      General: There is no distension.      Palpations: Abdomen is soft.      Tenderness: There is abdominal tenderness.      Comments: Hyperactive bowel sounds   Musculoskeletal:      Cervical back: Neck supple. No rigidity.      Right lower leg: No edema.      Left lower leg: No edema.   Skin:     General: Skin is warm and dry.      Coloration: Skin is pale.   Neurological:      General: No focal deficit present.      Mental Status: He is alert and oriented to person, place, and time. Mental status is at baseline.   Psychiatric:      Comments: Calm and cooperative  Very pleasant          Labs     Results from last 7 days   Lab Units 11/03/24  0647 11/03/24  0356 11/02/24  2344 11/02/24  1852   WBC 10*3/mm3 12.92* 12.99* 15.00* 13.88*   HEMATOCRIT  % 21.5* 20.2* 27.6* 31.7*   PLATELETS 10*3/mm3 252 270 323 315      Results from last 7 days   Lab Units 11/02/24  1852   SODIUM mmol/L 134*   POTASSIUM mmol/L 3.6   CHLORIDE mmol/L 98   CO2 mmol/L 24.5   BUN mg/dL 23   CREATININE mg/dL 1.23        Imaging     CT Abdomen Pelvis Without Contrast    Result Date: 11/3/2024  Impression: Diverticulosis without diverticulitis. No acute abdominal or pelvic abnormality. Electronically Signed: Jeffrey Pfeiffer MD  11/3/2024 2:14 AM EST  Workstation ID: [id number]    XR Chest 1 View    Result Date: 11/2/2024  Impression: No active pulmonary process. Electronically Signed: Joshua Perez MD  11/2/2024 7:18 PM EDT  Workstation ID: [id number]      Chest X ray: My independent assessment showed no infiltrates or effusions, no acute cardiopulmonary findings    EKG: My independent evaluation showed sinus rhythm with an intraventricular conduction delay, no ST -T changes    Current Medications     Scheduled Meds:  pantoprazole, 40 mg, Intravenous, Q12H  sodium chloride, 2,000 mL, Intravenous, Once  sodium chloride, 10 mL, Intravenous, Q12H  sodium-potassium-magnesium sulfates, 1 bottle, Oral, Q12H         Continuous Infusions:  norepinephrine, 0.02-0.3 mcg/kg/min, Last Rate: 0.02 mcg/kg/min (11/03/24 0750)  sodium chloride, 150 mL/hr, Last Rate: 150 mL/hr (11/02/24 2153)         KENNEDY Jimenez   Critical Care  11/03/24   07:56 EST

## 2024-11-03 NOTE — CONSULTS
[facility] MEDICINE SERVICE  TRANSFER OF CARE/ACCEPTANCE NOTE    PATIENT NAME: Anup Fernandez  : 1960  MRN: [id number]     Active Hospital Problems    Diagnosis  POA    **Syncope [R55]  Unknown    Hemorrhage of colon following colonoscopy [K91.840]  Yes    Rheumatoid arthritis involving hand [M06.9]  Yes      Resolved Hospital Problems   No resolved problems to display.     Patient is a 63-year-old male who was admitted on  for syncope and ABLA in the setting of BRBPR.  Patient was noted to be hypertensive with multiple episodes of bloody bowel movements.  Rapid response was called on 11 3 at 500.  Patient was transferred to ICU and placed on pressors for hemorrhagic shock.  Patient received a total of 5 units PRBCs.  Patient underwent colonoscopy with GI in which he underwent hemostasis of cecal ulcer.  Patient hemoglobin has remained stable.  Patient is no longer on pressors.  Patient stable for downgrade from ICU.    I have noted the following changes since admission.    I have reviewed the H&P, diagnostic data and plan of care for Anup Fernandez.  Hospitalist service will be taking over care of this patient during the current hospitalization.        Signature: Electronically signed by Darrian Arias PA-C, 24, 18:01 EST.  [facility] Hospitalist Team    **This is a nonbillable note

## 2024-11-03 NOTE — ANESTHESIA POSTPROCEDURE EVALUATION
Patient: Anup Fernandez    Procedure Summary       Date: 11/03/24 Room / Location: [facility] ENDOSCOPY 1 / [facility] ENDOSCOPY    Anesthesia Start: 0949 Anesthesia Stop: 1033    Procedure: COLONOSCOPY WITH SCLEROTHERAPY, ARGON PLASMA COAGULATION, AND ENDOSCOPIC CLIPPING X 3 OF POST POLYPECTOMY SITE AT BEDSIDE Diagnosis:     Surgeons: ALEX Yang MD Provider: Kian Graff MD    Anesthesia Type: MAC ASA Status: 4 - Emergent            Anesthesia Type: MAC    Vitals  Vitals Value Taken Time   /74 11/03/24 1251   Temp     Pulse 102 11/03/24 1300   Resp     SpO2 100 % 11/03/24 1300   Vitals shown include unfiled device data.        Post Anesthesia Care and Evaluation    Patient location during evaluation: ICU  Patient participation: complete - patient participated  Level of consciousness: awake  Pain scale: See nurse's notes for pain score.  Pain management: adequate    Airway patency: patent  Anesthetic complications: No anesthetic complications  PONV Status: none  Cardiovascular status: acceptable  Respiratory status: acceptable and spontaneous ventilation  Hydration status: acceptable    Comments: Patient seen and examined postoperatively; vital signs stable; SpO2 greater than or equal to 90%; cardiopulmonary status stable; nausea/vomiting adequately controlled; pain adequately controlled; no apparent anesthesia complications; patient discharged from anesthesia care when discharge criteria were met

## 2024-11-03 NOTE — CONSULTS
GI CONSULT  NOTE:    Referring Provider:    Garret    Chief complaint:    Rectal bleeding    Subjective .  Rectal bleeding    History of present illness:     Patient is a 63-year-old male with a history of GERD, hypertension, arthritis who underwent colonoscopy on 11/1/2024 and developed rectal bleeding yesterday 11/2/2024.  Patient has been passing blood clots.  Had lightheaded dizziness and near syncopal episode so came to the ER.  Hemoglobin was 11.1 with a baseline of 14.  Patient has continued to have rectal bleeding overnight and hemoglobin is dropped to 6.8.  Patient has received 2 units of packed red blood cells and is up to 7.1.  Additional blood and fluid boluses have been ordered.  Blood pressure is down to 60/30.  I have spoken to Michelle Man NP in critical care and patient is being transferred to the critical care unit.    Patient evidently started back his arthritis medicines the day after his procedure.  Those are generally held for 10 days post polypectomy.    LABS: WBCs 12.92, hemoglobin 7.1 currently with an MCV of 92.3, platelets 252.  Sodium 134, potassium 3.6, creatinine 1.23.  CT of the abdomen and pelvis without contrast done last night showed diverticulosis.  No acute abnormality.    Endo History:  11/1/24 Colonoscopy (Dr Yang) 2 mm polyp removed in the cecum.  Sigmoid colon polyp.  Mild diverticulosis of the descending and sigmoid colon.  Recall 2027 pending polyp pathology    Past Medical History:  History reviewed. No pertinent past medical history.    Past Surgical History:  Past Surgical History:   Procedure Laterality Date    COLONOSCOPY         Social History:  Social History     Tobacco Use    Smoking status: Former     Types: Cigarettes    Smokeless tobacco: Never   Vaping Use    Vaping status: Never Used   Substance Use Topics    Alcohol use: Never    Drug use: Never       Family History:  History reviewed. No pertinent family history.    Medications:  No medications prior to  admission.       Scheduled Meds:pantoprazole, 40 mg, Intravenous, Q12H  sodium chloride, 2,000 mL, Intravenous, Once  sodium chloride, 10 mL, Intravenous, Q12H  sodium-potassium-magnesium sulfates, 1 bottle, Oral, Q12H      Continuous Infusions:norepinephrine, 0.02-0.3 mcg/kg/min  sodium chloride, 150 mL/hr, Last Rate: 150 mL/hr (11/02/24 9003)      PRN Meds:.  acetaminophen **OR** acetaminophen **OR** acetaminophen    Calcium Replacement - Follow Nurse / BPA Driven Protocol    dextrose    dextrose    glucagon (human recombinant)    Magnesium Standard Dose Replacement - Follow Nurse / BPA Driven Protocol    nitroglycerin    Phosphorus Replacement - Follow Nurse / BPA Driven Protocol    Potassium Replacement - Follow Nurse / BPA Driven Protocol    [COMPLETED] Insert Peripheral IV **AND** sodium chloride    sodium chloride    sodium chloride    ALLERGIES:  Penicillins    ROS:  Review of Systems   Gastrointestinal:  Positive for abdominal pain, anal bleeding and blood in stool.     The following systems were reviewed and negative;    Constitution:  No fevers, chills, no unintentional weight loss  Skin: no rash, no jaundice  Eyes:  No blurry vision, no eye pain  HENT:  No change in hearing or smell  Resp:  No dyspnea or cough  CV:  No chest pain or palpitations  :  No dysuria, hematuria  Musculoskeletal:  No leg cramps or arthralgias  Neuro:  No tremor, no numbness  Psych:  No depression or confusion    Objective  Rm 306->2303    Vital Signs:   Vitals:    11/03/24 0439 11/03/24 0615 11/03/24 0624 11/03/24 0709   BP: 97/49 (!) 74/35 (!) 74/35 (!) 66/32   BP Location:       Patient Position:       Pulse: 85  100 92   Resp: 13  15    Temp: 97.9 °F (36.6 °C) 97.9 °F (36.6 °C) 97.9 °F (36.6 °C) 98.5 °F (36.9 °C)   TempSrc: Oral Oral Oral Oral   SpO2: 100%  100% 98%   Weight:       Height:           Physical Exam:   Per Dr Yang      Results Review:   I reviewed the patient's labs and imaging.  CBC    Results from last 7  days   Lab Units 11/03/24  0647 11/03/24  0356 11/02/24  2344 11/02/24  1852   WBC 10*3/mm3 12.92* 12.99* 15.00* 13.88*   HEMOGLOBIN g/dL 7.1* 6.8* 9.4* 11.1*   PLATELETS 10*3/mm3 252 270 323 315     CMP   Results from last 7 days   Lab Units 11/02/24  1852   SODIUM mmol/L 134*   POTASSIUM mmol/L 3.6   CHLORIDE mmol/L 98   CO2 mmol/L 24.5   BUN mg/dL 23   CREATININE mg/dL 1.23   GLUCOSE mg/dL 123*     Cr Clearance Estimated Creatinine Clearance: 67.8 mL/min (by C-G formula based on SCr of 1.23 mg/dL).  Coag   Results from last 7 days   Lab Units 11/02/24  1852   INR  1.02   APTT seconds 23.3*     HbA1C   Lab Results   Component Value Date    HGBA1C 5.5 12/24/2020     Blood Glucose   Glucose   Date/Time Value Ref Range Status   11/03/2024 0722 132 (H) 70 - 105 mg/dL Final     Comment:     Serial Number: [id number]:  [id number]     Infection     UA      Radiology(recent) CT Abdomen Pelvis Without Contrast    Result Date: 11/3/2024  Impression: Diverticulosis without diverticulitis. No acute abdominal or pelvic abnormality. Electronically Signed: Jeffrey Pfeiffer MD  11/3/2024 2:14 AM EST  Workstation ID: [id number]    XR Chest 1 View    Result Date: 11/2/2024  Impression: No active pulmonary process. Electronically Signed: Joshua Perez MD  11/2/2024 7:18 PM EDT  Workstation ID: [id number]       ASSESSMENT:  Acute blood loss anemia due to suspected post polypectomy bleed  Normocytic anemia  NSAID usage  GERD  Hypertension  Arthritis  Hyponatremia  Leukocytosis    PLAN:  Patient is 63-year-old male who underwent colonoscopy with polypectomy on 11/1/2024 started to have rectal bleeding yesterday.  Restarted NSAID yesterday. Near syncopal episode with diaphoresis, weakness at home and passing bright red blood per rectum with blood clots.  Patient was brought to the hospital and evaluated.  Initial hemoglobin was 11.4 with a baseline of 12.  Hemoglobin dropped to 9.4 and then down to 6.8.  Patient is given 2 units of  packed red blood cells and additional has been ordered.  Patient has been given fluid boluses.  Blood pressure remains low reported at 60/30.  Fast team x 2 called.  Spoke to WILAMR Martinez in the critical care unit and patient is being transferred to ICU room 2303 currently.    Plan for emergent colonoscopy once patient is hemodynamically stable.  Spoke to Dr. Graff with anesthesia.  Blood, fluids, pressors per ICU team       I discussed the patient's findings and my recommendations with the patient.  Tamiko Metzger, APRN  11/03/24  07:38 EST    Time:

## 2024-11-03 NOTE — CODE DOCUMENTATION
Patient will remain here until 2nd unit of PRBCs have been given. BP has gotten better since blood started.

## 2024-11-03 NOTE — NURSING NOTE
Rapid response called related to patient continued hypotension and bleeding. Patient will transfer to ICU.

## 2024-11-03 NOTE — SIGNIFICANT NOTE
Patient continues to have multiple episodes of bloody bowel movements, complained of worsening dizziness.  Hypotensive with systolic blood pressure in the 50s subsequently rapid response was called.  He received 2 L of normal saline bolus in addition to NS at 150cc/hr and will get a total of 2 units of packed red blood cells for hemoglobin of 6.8.  Plan for colonoscopy in the morning with Suprep preparation.

## 2024-11-03 NOTE — OP NOTE
COLONOSCOPY AT BEDSIDE Procedure Report    Patient Name:  Anup Fernandez  YOB: 1960    Date of Surgery:  11/3/2024     Pre-Op Diagnosis:  Rectal bleeding  Anemia       Post-Op Diagnosis Codes:  Cecal ulcer with visible vessel status post hemostasis    Post Op Diagnosis:   Cecal ulcer with visible vessel status post hemostasis    Procedure/CPT® Codes:      Procedure(s):  COLONOSCOPY AT BEDSIDE with hemostasis with scleral needle injection and APC coagulation and Endo Clip placement    Staff:  Surgeon(s):  ALEX Yang MD      Anesthesia: Monitored Anesthesia Care    Code Status:  Discussed the code status with patient, and they will remain full code    Description of Procedure:  A description of the procedure as well as risks, benefits and alternative methods were explained to the patient who voiced understanding and signed the corresponding consent form. A physical exam was performed and vital signs were monitored throughout the procedure.    A rectal exam was performed which was normal. An Olympus colonoscope was placed into the rectum and proceeded under direct visualization through the colon until the cecum and appendiceal orifice were identified. Careful visualization occurred upon slow withdraw of the scope. The scope was then retroflexed and the distal rectum was visualized. The quality of the prep was good. The procedure was not difficult and there were no immediate complications.  There was no blood loss.    Colonoscopy Findings:    1.  Ulcer in the cecum consistent with recent polypectomy, with visible vessel with active bleeding bright red blood.  Hemostasis was performed.  Initially scleral needle was used to inject 7 cc of epinephrine directly into and around to the visible vessel with improvement in bleeding.  Then APC coagulation on the right colon setting was used to coagulate the visible vessel/ulcer successfully with hemostasis.  Then 3 endoclips were placed directly across the  ulcer/visible vessel successfully with hemostasis.  Afterwards this area was irrigated with Endo Gator with no further bleeding seen.  2.  Large amount of red blood and clot throughout the whole colon.  This was irrigated extensively with no further sign of bleeding.  3.  A few small diverticula in the left colon with no sign of bleeding, these were irrigated extensively with Endo Gator.    Recommendations:   -Bleeding appears resolved after colonoscopy with hemostasis   -repeat hemoglobin after second unit of blood, transfuse additional blood if hemoglobin less than 7.  Continue to monitor at least every 8 hours until stable/improved  -Okay for clear liquid diet now, can advance later today if hemoglobin remained stable  -Avoid all NSAIDs for at least 2 weeks.  Use Tylenol for pain control  -Expect he will have further rectal bleeding as there is still a large amount of red blood and clot throughout the colon    -Hopefully can discharge home tomorrow 11/4 if hemoglobin remained stable  -Check iron stores and may need IV iron infusion      WADE Yang MD     Date: 11/3/2024    Time: 10:49 EST

## 2024-11-03 NOTE — H&P
[facility] Medicine Services  History & Physical    Patient Name: Anup Fernandez  : 1960  MRN: [id number]  Primary Care Physician:  Tray Betts MD  Date of admission: 2024  Date and Time of Service: 2024 at 2000    Subjective      Chief Complaint: Syncope    History of Present Illness: Anup Fernandez is a 63 y.o. male with a CMH of arthritis, hypertension, hyperlipidemia, GERD, diverticulitis, cervical spondylolysis with myelopathy and radiculopathy who presented to [facility] on 2024 with syncopal episode.  Patient had a colonoscopy done yesterday ().  3 polyps were excised with the largest being 15 mm.  Since then he has been having episodes of large volume bright red blood per rectum with clots.  On presentation patient had a syncopal episode while sitting.  It was associated with diaphoresis and dizziness.  Reports moderate abdominal pain since his procedure.  His last use of NSAIDs was the morning of presentation which he takes for rheumatoid arthritis.  Denies any postictal signs, focal neurological deficits.  Denies chest pain, shortness of breath, fever, nausea vomiting there was no head strike or falls.      Pertinent ED findings: Positive orthostatic vitals in the ED associated with dizziness with standing.  WBC 13.88 hemoglobin 11.1 (14 in 2024).  Dr. Mcdonald notified who recommends bowel prep and continue fluids.  Plan for colonoscopy in the AM.          Review of Systems   Constitutional:  Positive for diaphoresis. Negative for fatigue and fever.   Respiratory:  Negative for cough, choking, shortness of breath and wheezing.    Cardiovascular:  Negative for chest pain, palpitations and leg swelling.   Gastrointestinal:  Positive for abdominal pain, anal bleeding and blood in stool. Negative for abdominal distention, diarrhea, nausea, rectal pain and vomiting.   Genitourinary:  Negative for dysuria and frequency.   Neurological:  Positive for  dizziness, syncope and light-headedness. Negative for seizures, weakness, numbness and headaches.   Psychiatric/Behavioral:  Negative for agitation.                      Personal History     No past medical history on file.    No past surgical history on file.    Family History: family history is not on file. Otherwise pertinent FHx was reviewed and not pertinent to current issue.    Social History:      Home Medications:  Prior to Admission Medications       None              Allergies:  No Known Allergies    Objective      Vitals:   Temp:  [98.2 °F (36.8 °C)] 98.2 °F (36.8 °C)  Heart Rate:  [46-86] 46  Resp:  [16] 16  BP: ()/(39-81) 62/39  Body mass index is 30.85 kg/m².  Physical Exam  Constitutional:       Appearance: Normal appearance.   HENT:      Head: Normocephalic.      Right Ear: Tympanic membrane normal.      Left Ear: Tympanic membrane normal.      Mouth/Throat:      Mouth: Mucous membranes are moist.   Eyes:      Pupils: Pupils are equal, round, and reactive to light.   Cardiovascular:      Rate and Rhythm: Normal rate and regular rhythm.      Heart sounds: No murmur heard.  Pulmonary:      Effort: No respiratory distress.      Breath sounds: No wheezing.   Abdominal:      General: There is no distension.      Palpations: There is no mass.      Comments: Rectal exam done with Mariah MEDLEY chaperone.  Patient had bloody stool noted on rectal vault.  Also had about 300 cc of bloody bowel movement in commode   Musculoskeletal:         General: Normal range of motion.   Skin:     General: Skin is warm and dry.      Capillary Refill: Capillary refill takes less than 2 seconds.   Neurological:      General: No focal deficit present.      Mental Status: He is alert and oriented to person, place, and time.         Diagnostic Data:  Lab Results (last 24 hours)       Procedure Component Value Units Date/Time    Basic Metabolic Panel [[id number]]  (Abnormal) Collected: 11/02/24 3512    Specimen: Blood Updated:  11/02/24 1922     Glucose 123 mg/dL      BUN 23 mg/dL      Creatinine 1.23 mg/dL      Sodium 134 mmol/L      Potassium 3.6 mmol/L      Comment: Specimen hemolyzed.  Result may be falsely elevated.        Chloride 98 mmol/L      CO2 24.5 mmol/L      Calcium 8.4 mg/dL      BUN/Creatinine Ratio 18.7     Anion Gap 11.5 mmol/L      eGFR 65.2 mL/min/1.73     Narrative:      GFR Normal >60  Chronic Kidney Disease <60  Kidney Failure <15      Single High Sensitivity Troponin T [[id number]]  (Normal) Collected: 11/02/24 1852    Specimen: Blood Updated: 11/02/24 1921     HS Troponin T 9 ng/L     Narrative:      High Sensitive Troponin T Reference Range:  <14.0 ng/L- Negative Female for AMI  <22.0 ng/L- Negative Male for AMI  >=14 - Abnormal Female indicating possible myocardial injury.  >=22 - Abnormal Male indicating possible myocardial injury.   Clinicians would have to utilize clinical acumen, EKG, Troponin, and serial changes to determine if it is an Acute Myocardial Infarction or myocardial injury due to an underlying chronic condition.         Protime-INR [[id number]]  (Normal) Collected: 11/02/24 1852    Specimen: Blood Updated: 11/02/24 1920     Protime 11.1 Seconds      INR 1.02    aPTT [[id number]]  (Abnormal) Collected: 11/02/24 1852    Specimen: Blood Updated: 11/02/24 1920     PTT 23.3 seconds     CBC & Differential [[id number]]  (Abnormal) Collected: 11/02/24 1852    Specimen: Blood Updated: 11/02/24 1858    Narrative:      The following orders were created for panel order CBC & Differential.  Procedure                               Abnormality         Status                     ---------                               -----------         ------                     CBC Auto Differential[[id number]]        Abnormal            Final result                 Please view results for these tests on the individual orders.    CBC Auto Differential [[id number]]  (Abnormal) Collected: 11/02/24 1852    Specimen: Blood Updated:  11/02/24 1858     WBC 13.88 10*3/mm3      RBC 3.59 10*6/mm3      Hemoglobin 11.1 g/dL      Hematocrit 31.7 %      MCV 88.3 fL      MCH 30.9 pg      MCHC 35.0 g/dL      RDW 13.2 %      RDW-SD 42.5 fl      MPV 9.9 fL      Platelets 315 10*3/mm3      Neutrophil % 72.1 %      Lymphocyte % 18.6 %      Monocyte % 6.8 %      Eosinophil % 1.1 %      Basophil % 0.4 %      Immature Grans % 1.0 %      Neutrophils, Absolute 10.01 10*3/mm3      Lymphocytes, Absolute 2.58 10*3/mm3      Monocytes, Absolute 0.95 10*3/mm3      Eosinophils, Absolute 0.15 10*3/mm3      Basophils, Absolute 0.05 10*3/mm3      Immature Grans, Absolute 0.14 10*3/mm3      nRBC 0.0 /100 WBC              Imaging Results (Last 24 Hours)       Procedure Component Value Units Date/Time    XR Chest 1 View [[id number]] Collected: 11/02/24 1918     Updated: 11/02/24 1920    Narrative:      XR CHEST 1 VW    Date of Exam: 11/2/2024 7:01 PM EDT    Indication: syncope    Comparison: Coronary CT 2/27/2024    Findings:  Aortic atherosclerotic disease. Heart size normal. Negative for lobar consolidation, edema, effusion or pneumothorax. Degenerative related osseous changes. Cervical fusion hardware.      Impression:      Impression:  No active pulmonary process.      Electronically Signed: Joshua Perez MD    11/2/2024 7:18 PM EDT    Workstation ID: [id number]              Assessment & Plan        This is a 63 y.o. male with:    Active and Resolved Problems  Active Hospital Problems    Diagnosis  POA    Hemorrhage of colon following colonoscopy [K91.840]  Yes    Rheumatoid arthritis involving hand [M06.9]  Yes      Resolved Hospital Problems   No resolved problems to display.       Assessment and plan    Syncope likely due to blood loss after colonoscopy  -Status post 1 L bolus of NS, continue on NS at 150 cc/h  -Type and screen  -CBC every 6 hours and transfuse for hemoglobin less than 8  -CT abdomen and pelvis pending  -Dr. Yang (GI) consulted, bowel prep with plan  for colonoscopy in the a.m.  -Start on on Protonix 40 IV every 12 hours  -DC use of NSAIDs (oxaprozin) for now    Leukocytosis likely reactive  -Trend CBC in the a.m.    Rheumatoid arthritis on NSAIDs  -DC use of NSAIDs (oxaprozin) for now    History of GERD  History of chronic neck pain with radiculopathy  History of hypertension  -Resume home meds when verified by pharmacy and clinically appropriate  -Start on on Protonix 40 IV every 12 hours            VTE Prophylaxis:  No VTE prophylaxis order currently exists.        The patient desires to be as follows:    CODE STATUS:           Lizette Fernandez, who can be contacted at [phone], is the designated person to make medical decisions on the patient's behalf if He is incapable of doing so. This was clarified with patient and/or next of kin on 11/2/2024 during the course of this H&P.    Admission Status:  I believe this patient meets inpatient status.    Expected Length of Stay: Greater than 2 nights    PDMP and Medication Dispenses via Sidebar reviewed and consistent with patient reported medications.    I discussed the patient's findings and my recommendations with patient and family.      Signature:     This document has been electronically signed by Jean Paul Mayers MD on November 2, 2024 21:01 T   [facility] Hospitalist Team

## 2024-11-03 NOTE — CODE DOCUMENTATION
Patient Hgb back, 6.8 new orders placed for an additional unit of PRBCs.  One was already ordered, but not given yet.  First unit being started at this time.

## 2024-11-03 NOTE — ANESTHESIA PREPROCEDURE EVALUATION
Anesthesia Evaluation     Patient summary reviewed and Nursing notes reviewed                Airway   Mallampati: II  TM distance: >3 FB  Neck ROM: full  No difficulty expected  Dental - normal exam     Pulmonary    Cardiovascular         Neuro/Psych  (+) syncope  GI/Hepatic/Renal/Endo    (+) GI bleeding     Musculoskeletal     Abdominal    Substance History      OB/GYN          Other   arthritis, blood dyscrasia anemia,                 Anesthesia Plan    ASA 4 - emergent     MAC     intravenous induction     Anesthetic plan, risks, benefits, and alternatives have been provided, discussed and informed consent has been obtained with: patient.    CODE STATUS:    Code Status (Patient has no pulse and is not breathing): CPR (Attempt to Resuscitate)  Medical Interventions (Patient has pulse or is breathing): Full Support       activity

## 2024-11-04 ENCOUNTER — INPATIENT HOSPITAL (AMBULATORY)
Age: 64
End: 2024-11-04

## 2024-11-04 ENCOUNTER — INPATIENT HOSPITAL (AMBULATORY)
Dept: URBAN - METROPOLITAN AREA HOSPITAL 84 | Facility: HOSPITAL | Age: 64
End: 2024-11-04

## 2024-11-04 DIAGNOSIS — K91.840 POSTPROCEDURAL HEMORRHAGE OF A DIGESTIVE SYSTEM ORGAN OR STR: ICD-10-CM

## 2024-11-04 DIAGNOSIS — D62 ACUTE POSTHEMORRHAGIC ANEMIA: ICD-10-CM

## 2024-11-04 DIAGNOSIS — K62.5 HEMORRHAGE OF ANUS AND RECTUM: ICD-10-CM

## 2024-11-04 LAB
ALBUMIN SERPL-MCNC: 3.2 G/DL (ref 3.5–5.2)
ALBUMIN/GLOB SERPL: 1.9 G/DL
ALP SERPL-CCNC: 50 U/L (ref 39–117)
ALT SERPL W P-5'-P-CCNC: 17 U/L (ref 1–41)
ANION GAP SERPL CALCULATED.3IONS-SCNC: 6 MMOL/L (ref 5–15)
AST SERPL-CCNC: 19 U/L (ref 1–40)
BASOPHILS # BLD AUTO: 0.04 10*3/MM3 (ref 0–0.2)
BASOPHILS NFR BLD AUTO: 0.3 % (ref 0–1.5)
BH BB BLOOD EXPIRATION DATE: NORMAL
BH BB BLOOD TYPE BARCODE: 5100
BH BB BLOOD TYPE BARCODE: 6200
BH BB BLOOD TYPE BARCODE: 7300
BH BB DISPENSE STATUS: NORMAL
BH BB PRODUCT CODE: NORMAL
BH BB UNIT NUMBER: NORMAL
BILIRUB SERPL-MCNC: 0.7 MG/DL (ref 0–1.2)
BUN SERPL-MCNC: 13 MG/DL (ref 8–23)
BUN/CREAT SERPL: 14.9 (ref 7–25)
CA-I SERPL ISE-MCNC: 1.11 MMOL/L (ref 1.15–1.3)
CA-I SERPL ISE-MCNC: 1.13 MMOL/L (ref 1.15–1.3)
CALCIUM SPEC-SCNC: 8.5 MG/DL (ref 8.6–10.5)
CHLORIDE SERPL-SCNC: 105 MMOL/L (ref 98–107)
CO2 SERPL-SCNC: 27 MMOL/L (ref 22–29)
CREAT SERPL-MCNC: 0.87 MG/DL (ref 0.76–1.27)
CROSSMATCH INTERPRETATION: NORMAL
DEPRECATED RDW RBC AUTO: 47.2 FL (ref 37–54)
EGFRCR SERPLBLD CKD-EPI 2021: 95.8 ML/MIN/1.73
EOSINOPHIL # BLD AUTO: 0.1 10*3/MM3 (ref 0–0.4)
EOSINOPHIL NFR BLD AUTO: 0.8 % (ref 0.3–6.2)
ERYTHROCYTE [DISTWIDTH] IN BLOOD BY AUTOMATED COUNT: 14.8 % (ref 12.3–15.4)
GLOBULIN UR ELPH-MCNC: 1.7 GM/DL
GLUCOSE SERPL-MCNC: 95 MG/DL (ref 65–99)
HCT VFR BLD AUTO: 28.1 % (ref 37.5–51)
HCT VFR BLD AUTO: 29.7 % (ref 37.5–51)
HCT VFR BLD AUTO: 30.8 % (ref 37.5–51)
HGB BLD-MCNC: 10 G/DL (ref 13–17.7)
HGB BLD-MCNC: 10.4 G/DL (ref 13–17.7)
HGB BLD-MCNC: 9.9 G/DL (ref 13–17.7)
IMM GRANULOCYTES # BLD AUTO: 0.1 10*3/MM3 (ref 0–0.05)
IMM GRANULOCYTES NFR BLD AUTO: 0.8 % (ref 0–0.5)
LYMPHOCYTES # BLD AUTO: 1.79 10*3/MM3 (ref 0.7–3.1)
LYMPHOCYTES NFR BLD AUTO: 13.8 % (ref 19.6–45.3)
MAGNESIUM SERPL-MCNC: 1.8 MG/DL (ref 1.6–2.4)
MCH RBC QN AUTO: 29 PG (ref 26.6–33)
MCHC RBC AUTO-ENTMCNC: 33.3 G/DL (ref 31.5–35.7)
MCV RBC AUTO: 87.1 FL (ref 79–97)
MONOCYTES # BLD AUTO: 1.09 10*3/MM3 (ref 0.1–0.9)
MONOCYTES NFR BLD AUTO: 8.4 % (ref 5–12)
NEUTROPHILS NFR BLD AUTO: 75.9 % (ref 42.7–76)
NEUTROPHILS NFR BLD AUTO: 9.81 10*3/MM3 (ref 1.7–7)
NRBC BLD AUTO-RTO: 0 /100 WBC (ref 0–0.2)
PLATELET # BLD AUTO: 152 10*3/MM3 (ref 140–450)
PMV BLD AUTO: 9.8 FL (ref 6–12)
POTASSIUM SERPL-SCNC: 3.6 MMOL/L (ref 3.5–5.2)
POTASSIUM SERPL-SCNC: 4.2 MMOL/L (ref 3.5–5.2)
PROT SERPL-MCNC: 4.9 G/DL (ref 6–8.5)
RBC # BLD AUTO: 3.41 10*6/MM3 (ref 4.14–5.8)
SODIUM SERPL-SCNC: 138 MMOL/L (ref 136–145)
UNIT  ABO: NORMAL
UNIT  RH: NORMAL
WBC NRBC COR # BLD AUTO: 12.93 10*3/MM3 (ref 3.4–10.8)

## 2024-11-04 PROCEDURE — 85025 COMPLETE CBC W/AUTO DIFF WBC: CPT | Performed by: NURSE PRACTITIONER

## 2024-11-04 PROCEDURE — 85014 HEMATOCRIT: CPT | Performed by: STUDENT IN AN ORGANIZED HEALTH CARE EDUCATION/TRAINING PROGRAM

## 2024-11-04 PROCEDURE — 83735 ASSAY OF MAGNESIUM: CPT | Performed by: NURSE PRACTITIONER

## 2024-11-04 PROCEDURE — 99232 SBSQ HOSP IP/OBS MODERATE 35: CPT | Mod: FS | Performed by: NURSE PRACTITIONER

## 2024-11-04 PROCEDURE — 82330 ASSAY OF CALCIUM: CPT

## 2024-11-04 PROCEDURE — 97162 PT EVAL MOD COMPLEX 30 MIN: CPT

## 2024-11-04 PROCEDURE — 80053 COMPREHEN METABOLIC PANEL: CPT | Performed by: NURSE PRACTITIONER

## 2024-11-04 PROCEDURE — 25010000002 CALCIUM GLUCONATE-NACL 1-0.675 GM/50ML-% SOLUTION

## 2024-11-04 PROCEDURE — 84132 ASSAY OF SERUM POTASSIUM: CPT | Performed by: INTERNAL MEDICINE

## 2024-11-04 PROCEDURE — 85018 HEMOGLOBIN: CPT | Performed by: STUDENT IN AN ORGANIZED HEALTH CARE EDUCATION/TRAINING PROGRAM

## 2024-11-04 RX ORDER — POTASSIUM CHLORIDE 1500 MG/1
40 TABLET, EXTENDED RELEASE ORAL EVERY 4 HOURS
Status: COMPLETED | OUTPATIENT
Start: 2024-11-04 | End: 2024-11-04

## 2024-11-04 RX ORDER — CALCIUM GLUCONATE 20 MG/ML
1000 INJECTION, SOLUTION INTRAVENOUS ONCE
Status: COMPLETED | OUTPATIENT
Start: 2024-11-04 | End: 2024-11-04

## 2024-11-04 RX ADMIN — Medication 10 ML: at 08:40

## 2024-11-04 RX ADMIN — AMLODIPINE BESYLATE 10 MG: 5 TABLET ORAL at 08:39

## 2024-11-04 RX ADMIN — GABAPENTIN 600 MG: 300 CAPSULE ORAL at 14:55

## 2024-11-04 RX ADMIN — CALCIUM GLUCONATE 1000 MG: 20 INJECTION, SOLUTION INTRAVENOUS at 01:41

## 2024-11-04 RX ADMIN — PANTOPRAZOLE SODIUM 40 MG: 40 INJECTION, POWDER, FOR SOLUTION INTRAVENOUS at 21:12

## 2024-11-04 RX ADMIN — GABAPENTIN 600 MG: 300 CAPSULE ORAL at 21:12

## 2024-11-04 RX ADMIN — POTASSIUM CHLORIDE 40 MEQ: 1500 TABLET, EXTENDED RELEASE ORAL at 12:07

## 2024-11-04 RX ADMIN — POTASSIUM CHLORIDE 40 MEQ: 1500 TABLET, EXTENDED RELEASE ORAL at 08:39

## 2024-11-04 RX ADMIN — PANTOPRAZOLE SODIUM 40 MG: 40 INJECTION, POWDER, FOR SOLUTION INTRAVENOUS at 08:39

## 2024-11-04 NOTE — CASE MANAGEMENT/SOCIAL WORK
Continued Stay Note   Sudhir     Patient Name: Anup Fernandez  MRN: [id number]  Today's Date: 11/4/2024    Admit Date: 11/2/2024    Plan: Plan to return home with spouse.   Discharge Plan       Row Name 11/04/24 1245       Plan    Plan Plan to return home with spouse.    Patient/Family in Agreement with Plan yes    Plan Comments CM met with patient/spouse at bedside. Patient A&Ox4. Patient lives at home with spouse who will transport at discharge. Patient performs ADLs. PCP and pharmacy confirmed. Agreeable to M2B.  Denies financial assistance needs for medication and/or food. Denies any current DME, HH, Caregiver, or rehab services. DC Barriers:  monitor HGB overnight, plan to dc home 11/5, GI following.               Expected Discharge Date and Time       Expected Discharge Date Expected Discharge Time    Nov 6, 2024               YISEL Yepez RN  ICU/CVU   O: [phone]  C: [phone]  [email or web address]

## 2024-11-04 NOTE — THERAPY EVALUATION
Patient Name: Anup Fernandez  : 1960    MRN: [id number]                              Today's Date: 2024       Admit Date: 2024    Visit Dx:     ICD-10-CM ICD-9-CM   1. Syncope, unspecified syncope type  R55 780.2   2. Rectal bleeding  K62.5 569.3     Patient Active Problem List   Diagnosis    Hemorrhage of colon following colonoscopy    Rheumatoid arthritis involving hand    Syncope     History reviewed. No pertinent past medical history.  Past Surgical History:   Procedure Laterality Date    COLONOSCOPY        General Information       Row Name 24 [facility]7          Physical Therapy Time and Intention    Document Type evaluation  -     Mode of Treatment physical therapy  -       Row Name 24 1527          General Information    Patient Profile Reviewed yes  -     Prior Level of Function independent:;gait;driving;ADL's  -     Existing Precautions/Restrictions fall  -     Barriers to Rehab none identified  -[facility] Name 24 1527          Living Environment    People in Home spouse;child(tito), dependent  -       Row Name 24 1527          Home Main Entrance    Number of Stairs, Main Entrance one  -     Stair Railings, Main Entrance none  -       Row Name 24 1527          Stairs Within Home, Primary    Number of Stairs, Within Home, Primary none  -       Row Name 24 1527          Cognition    Orientation Status (Cognition) oriented x 4  -[facility] Name 24 1527          Safety Issues/Impairments Affecting Functional Mobility    Impairments Affecting Function (Mobility) endurance/activity tolerance;balance  -               User Key  (r) = Recorded By, (t) = Taken By, (c) = Cosigned By      Initials Name Provider Type     Gloria Bazzi, PT Physical Therapist                   Mobility       Row Name 24 1528          Bed Mobility    Bed Mobility bed mobility (all) activities  -     All Activities, [place] (Bed Mobility)  standby assist  -     Assistive Device (Bed Mobility) bed rails;head of bed elevated  -[facility] Name 11/04/24 1528          Bed-Chair Transfer    Bed-Chair [place] (Transfers) contact guard  -[facility] Name 11/04/24 1528          Sit-Stand Transfer    Sit-Stand [place] (Transfers) contact guard  -[facility] Name 11/04/24 1528          Gait/Stairs (Locomotion)    [place] Level (Gait) contact guard  -     Patient was able to Ambulate yes  -     Distance in Feet (Gait) 75  -     Deviations/Abnormal Patterns (Gait) base of support, wide;gait speed decreased;stride length decreased  -               User Key  (r) = Recorded By, (t) = Taken By, (c) = Cosigned By      Initials Name Provider Type    Gloria Romero, [place] Physical Therapist                   Obj/Interventions       [facility] Name 11/04/24 1528          Range of Motion Comprehensive    General Range of Motion no range of motion deficits identified  -AH       Row Name 11/04/24 1528          Strength Comprehensive (MMT)    General Manual Muscle Testing (MMT) Assessment no strength deficits identified  -AH       Row Name 11/04/24 1528          Balance    Balance Assessment sitting static balance;sitting dynamic balance;standing static balance;standing dynamic balance  -     Static Sitting Balance independent  -     Dynamic Sitting Balance independent  -     Position, Sitting Balance unsupported;sitting edge of bed  -     Static Standing Balance contact guard  -     Dynamic Standing Balance contact guard  -     Position/Device Used, Standing Balance unsupported  -[facility] Name 11/04/24 1528          Sensory Assessment (Somatosensory)    Sensory Assessment (Somatosensory) sensation intact  -               User Key  (r) = Recorded By, (t) = Taken By, (c) = Cosigned By      Initials Name Provider Type    Gloria Romero, [place] Physical Therapist                   Goals/Plan       Row Name 11/04/24 1535           Transfer Goal 1 (PT)    Activity/Assistive Device (Transfer Goal 1, PT) transfers, all  -     [place] Level/Cues Needed (Transfer Goal 1, PT) independent  -     Time Frame (Transfer Goal 1, PT) long term goal (LTG);2 weeks  -       Row Name 11/04/24 1535          Gait Training Goal 1 (PT)    Activity/Assistive Device (Gait Training Goal 1, PT) gait (walking locomotion)  -     [place] Level (Gait Training Goal 1, PT) independent  -     Distance (Gait Training Goal 1, PT) 150 ft  -     Time Frame (Gait Training Goal 1, PT) long term goal (LTG);2 weeks  -       Row Name 11/04/24 1535          Therapy Assessment/Plan (PT)    Planned Therapy Interventions (PT) balance training;gait training;patient/family education;strengthening;transfer training;home exercise program  -               User Key  (r) = Recorded By, (t) = Taken By, (c) = Cosigned By      Initials Name Provider Type    Gloria Romero, PT Physical Therapist                   Clinical Impression       Row Name 11/04/24 1529          Pain    Pretreatment Pain Rating 0/10 - no pain  -     Posttreatment Pain Rating 0/10 - no pain  -       Row Name 11/04/24 1529          Plan of Care Review    Plan of Care Reviewed With patient;spouse  -     Outcome Evaluation Patient is a 63-year-old male with history of rheumatoid arthritis who presented on 11/2/24 with complaints of rectal bleeding. Patient underwent colonoscopy with polypectomy on 11/1 and developed large amounts of rectal bleeding following. Initial hemoglobin on admission was 11.4 which dropped as low as 6.8 this admission.  Rapid response was called on 11/3 at 0500. Patient was transferred to ICU and placed on pressors for hemorrhagic shock, received a total of 5 units PRBCs. S/p repeat colonoscopy in 11/3 which showed an ulcer in the cecum consistent with recent polypectomy with visible vessel s/p epinephrine injection, APC, and Endo Clip placement x 3.  "Pt is A&Ox4 and reports PLOF as IND with mobility w/o AD use, ADLs, and is an active . Pt lives with his wife and 2 dep children in a H with 1 small RENATO. This date, pt is IND with bed mobility, CGA with transfers and ambulation w/o AD. Pt demos good dynamic standing balance and strength and ROM WFL for ADLs. Pt has a wide NIKA, decreased step length and gait speed this date and reports he feels \"woozy\" with standing activity. BP as follows, 142/80 in supine, 153/83 in sitting, 134/77 after activity. Pt appears to be near baseline and safe to return home with family assist upon d/c. PT will follow to increase pt mobility while IP.  -       Row Name 11/04/24 1529          Therapy Assessment/Plan (PT)    Rehab Potential (PT) good  -     Criteria for Skilled Interventions Met (PT) yes  -     Therapy Frequency (PT) 3 times/wk  -       Row Name 11/04/24 1529          Vital Signs    Pre Systolic BP Rehab 142  -AH     Pre Treatment Diastolic BP 80  -AH     Intra Systolic BP Rehab 153  -AH     Intra Treatment Diastolic BP 83  -AH     Post Systolic BP Rehab 134  -AH     Post Treatment Diastolic BP 77  -AH     Pretreatment Heart Rate (beats/min) 101  -AH     Intratreatment Heart Rate (beats/min) 104  -AH     Posttreatment Heart Rate (beats/min) 112  -AH     Pre Patient Position Supine  -AH     Intra Patient Position Sitting  -AH     Post Patient Position Sitting  -       Row Name 11/04/24 1529          Positioning and Restraints    Pre-Treatment Position in bed  -     Post Treatment Position chair  -AH     In Chair notified nsg;call light within reach;sitting;encouraged to call for assist;exit alarm on;with family/caregiver  -               User Key  (r) = Recorded By, (t) = Taken By, (c) = Cosigned By      Initials Name Provider Type    Gloria Romero, PT Physical Therapist                   Outcome Measures       Row Name 11/04/24 0846          How much help from another person do you " currently need...    Turning from your back to your side while in flat bed without using bedrails? 4  -BR     Moving from lying on back to sitting on the side of a flat bed without bedrails? 4  -BR     Moving to and from a bed to a chair (including a wheelchair)? 4  -BR     Standing up from a chair using your arms (e.g., wheelchair, bedside chair)? 4  -BR     Climbing 3-5 steps with a railing? 4  -BR     To walk in hospital room? 4  -BR     AM-PAC 6 Clicks Score (PT) 24  -BR               User Key  (r) = Recorded By, (t) = Taken By, (c) = Cosigned By      Initials Name Provider Type    BR Shana Mcdonald, RN Registered Nurse                                 Physical Therapy Education       Title: PT OT SLP Therapies (Done)       Topic: Physical Therapy (Done)       Point: Mobility training (Done)       Learning Progress Summary            Patient Acceptance, E, VU by  at 11/4/2024 1536   Significant Other Acceptance, E, VU by  at 11/4/2024 1536                      Point: Home exercise program (Done)       Learning Progress Summary            Patient Acceptance, E, VU by  at 11/4/2024 1536   Significant Other Acceptance, E, VU by  at 11/4/2024 1536                      Point: Body mechanics (Done)       Learning Progress Summary            Patient Acceptance, E, VU by  at 11/4/2024 1536   Significant Other Acceptance, E, VU by  at 11/4/2024 1536                      Point: Precautions (Done)       Learning Progress Summary            Patient Acceptance, E, VU by  at 11/4/2024 1536   Significant Other Acceptance, E, VU by  at 11/4/2024 1536                                      User Key       Initials Effective Dates Name Provider Type Discipline     08/12/24 -  Gloria Bazzi, PT Physical Therapist PT                  PT Recommendation and Plan  Planned Therapy Interventions (PT): balance training, gait training, patient/family education, strengthening, transfer training, home exercise  "program  Outcome Evaluation: Patient is a 63-year-old male with history of rheumatoid arthritis who presented on 11/2/24 with complaints of rectal bleeding. Patient underwent colonoscopy with polypectomy on 11/1 and developed large amounts of rectal bleeding following. Initial hemoglobin on admission was 11.4 which dropped as low as 6.8 this admission.  Rapid response was called on 11/3 at 0500. Patient was transferred to ICU and placed on pressors for hemorrhagic shock, received a total of 5 units PRBCs. S/p repeat colonoscopy in 11/3 which showed an ulcer in the cecum consistent with recent polypectomy with visible vessel s/p epinephrine injection, APC, and Endo Clip placement x 3. Pt is A&Ox4 and reports PLOF as IND with mobility w/o AD use, ADLs, and is an active . Pt lives with his wife and 2 dep children in a [facility] with 1 small [place]O. This date, pt is IND with bed mobility, CGA with transfers and ambulation w/o AD. Pt demos good dynamic standing balance and strength and ROM WFL for ADLs. Pt has a wide NIKA, decreased step length and gait speed this date and reports he feels \"woozy\" with standing activity. BP as follows, 142/80 in supine, 153/83 in sitting, 134/77 after activity. Pt appears to be near baseline and safe to return home with family assist upon d/c. PT will follow to increase pt mobility while IP.     Time Calculation:         PT Charges       Row Name 11/04/24 1537             Time Calculation    Start Time 1416  -      Stop Time 1440  -      Time Calculation (min) 24 min  -      PT Received On 11/04/24  -      PT - Next Appointment 11/05/24  -      PT Goal Re-Cert Due Date 11/18/24  -                User Key  (r) = Recorded By, (t) = Taken By, (c) = Cosigned By      Initials Name Provider Type    Gloria Romero, [place] Physical Therapist                  Therapy Charges for Today       Code Description Service Date Service Provider Modifiers Qty    [id number]  PT EVAL MOD " COMPLEXITY 4 11/4/2024 Gloria Bazzi, PT GP 1            PT G-Codes  AM-PAC 6 Clicks Score (PT): 24  PT Discharge Summary  Anticipated Discharge Disposition (PT): home with assist    Gloria Bazzi, PT  11/4/2024

## 2024-11-04 NOTE — PROGRESS NOTES
[facility] MEDICINE SERVICE  DAILY PROGRESS NOTE    NAME: Anup Fernandez  : 1960  MRN: [id number]      LOS: 2 days     PROVIDER OF SERVICE: Elke Fischer MD    Chief Complaint: Syncope    Subjective:     Interval History:    Patient seen and evaluated at bedside. No additional episodes of bloody bm per patient. Feeling much better without complaint.    Treatment plan discussed with patient. All questions addressed.     Review of Systems:   Denies fevers, chills  Denies chest pain, edema  Denies shortness of breath, cough  Denies nausea, vomiting, diarrhea  Denies dysuria, hematuria    Objective:     Vital Signs  Temp:  [97.5 °F (36.4 °C)-99.4 °F (37.4 °C)] 98.5 °F (36.9 °C)  Heart Rate:  [] 99  Resp:  [16] 16  BP: ()/() 143/87   Body mass index is 25.31 kg/m².    Physical Exam   General: No acute distress, appears stated age  Neuro: Awake and alert, oriented x3, no focal deficits appreciated  HEENT: EOMI, moist mucus membranes  CV: RRR, no murmurs appreciated, no peripheral edema  Pulm: CTAB, no increased work of breathing  Abd: Soft, nontender, nondistended  Skin: Warm, dry and intact  Psych: Appropriate mood and affect    Scheduled Meds   [Held by provider] amLODIPine, 10 mg, Oral, Q24H   And  [Held by provider] valsartan, 320 mg, Oral, Q24H  [Held by provider] atorvastatin, 10 mg, Oral, Daily  [Held by provider] gabapentin, 600 mg, Oral, Q8H  [Held by provider] hydroCHLOROthiazide, 12.5 mg, Oral, Daily  pantoprazole, 40 mg, Intravenous, Q12H  potassium chloride ER, 40 mEq, Oral, Q4H  sodium chloride, 2,000 mL, Intravenous, Once  sodium chloride, 10 mL, Intravenous, Q12H  sodium chloride, 10 mL, Intravenous, Q12H  sodium chloride, 10 mL, Intravenous, Q12H  sodium chloride, 10 mL, Intravenous, Q12H       PRN Meds     acetaminophen **OR** acetaminophen **OR** acetaminophen    Calcium Replacement - Follow Nurse / BPA Driven Protocol    dextrose    dextrose    EPINEPHrine     glucagon (human recombinant)    [Held by provider] HYDROcodone-acetaminophen    Magnesium Standard Dose Replacement - Follow Nurse / BPA Driven Protocol    nitroglycerin    ondansetron    Phosphorus Replacement - Follow Nurse / BPA Driven Protocol    Potassium Replacement - Follow Nurse / BPA Driven Protocol    [COMPLETED] Insert Peripheral IV **AND** sodium chloride    sodium chloride    sodium chloride    sodium chloride    sodium chloride    sodium chloride   Infusions  norepinephrine, 0.02-0.3 mcg/kg/min, Last Rate: 0.2 mcg/kg/min (11/03/24 1125)          Diagnostic Data    Results from last 7 days   Lab Units 11/04/24  0535   WBC 10*3/mm3 12.93*   HEMOGLOBIN g/dL 9.9*   HEMATOCRIT % 29.7*   PLATELETS 10*3/mm3 152   GLUCOSE mg/dL 95   CREATININE mg/dL 0.87   BUN mg/dL 13   SODIUM mmol/L 138   POTASSIUM mmol/L 3.6   AST (SGOT) U/L 19   ALT (SGPT) U/L 17   ALK PHOS U/L 50   BILIRUBIN mg/dL 0.7   ANION GAP mmol/L 6.0       CT Abdomen Pelvis Without Contrast    Result Date: 11/3/2024  Impression: Diverticulosis without diverticulitis. No acute abdominal or pelvic abnormality. Electronically Signed: Jeffrey Pfeiffer MD  11/3/2024 2:14 AM EST  Workstation ID: [id number]    XR Chest 1 View    Result Date: 11/2/2024  Impression: No active pulmonary process. Electronically Signed: Joshua Perez MD  11/2/2024 7:18 PM EDT  Workstation ID: [id number]     Interval results reviewed.    Assessment/Plan:     Hemorrhagic shock due to acute blood loss anemia  Cecal ulcer bleed  - GI consulted, appreciate recs  - s/p endoscopy 11/3/24 with evidence of cecal ulcer with visible vessel s/p hemostasis  - Underwent MTP with 7u pRBC, 4u FFP, 1u Plt, 1u Cryo  - Hgb stable today at 9.9 without s/sx of bleeding following procedure  - H/H q12h  - Continue ppi  - Advance to healthy heart diet per GI  - Needs GI follow up 6-8 weeks from discharge    Essential hypertension  - Resume home amlodipine/valsartan  - Hold HCTZ, may need to resume if  remains hypertensive    Hypocalcemia  - Repletion per protocol  - Repeat labs in AM    Hyperlipidemia  - Resume home statin    Treatment plan discussed with nursing staff, case management.     VTE Prophylaxis:  Mechanical VTE prophylaxis orders are present.    Code status is   Code Status and Medical Interventions: CPR (Attempt to Resuscitate); Full Support   Ordered at: 11/03/24 0343     Code Status (Patient has no pulse and is not breathing):    CPR (Attempt to Resuscitate)     Medical Interventions (Patient has pulse or is breathing):    Full Support       Plan for disposition: Home 11/5/24    Barriers to discharge: Continue monitoring H/H, tolerating diet, resolved s/sx of gi bleed    Time: 35+ minutes     Signature: Electronically signed by Elke Fischer MD, 11/04/24, 07:51 EST.  [facility] Hospitalist Team

## 2024-11-04 NOTE — PLAN OF CARE
"Goal Outcome Evaluation:  Plan of Care Reviewed With: patient, spouse           Outcome Evaluation: Patient is a 63-year-old male with history of rheumatoid arthritis who presented on 11/2/24 with complaints of rectal bleeding. Patient underwent colonoscopy with polypectomy on 11/1 and developed large amounts of rectal bleeding following. Initial hemoglobin on admission was 11.4 which dropped as low as 6.8 this admission.  Rapid response was called on 11/3 at 0500. Patient was transferred to ICU and placed on pressors for hemorrhagic shock, received a total of 5 units PRBCs. S/p repeat colonoscopy in 11/3 which showed an ulcer in the cecum consistent with recent polypectomy with visible vessel s/p epinephrine injection, APC, and Endo Clip placement x 3. Pt is A&Ox4 and reports PLOF as IND with mobility w/o AD use, ADLs, and is an active . Pt lives with his wife and 2 dep children in a [facility] with 1 small [facility]. This date, pt is IND with bed mobility, CGA with transfers and ambulation w/o AD. Pt demos good dynamic standing balance and strength and ROM WFL for ADLs. Pt has a wide NIKA, decreased step length and gait speed this date and reports he feels \"woozy\" with standing activity. BP as follows, 142/80 in supine, 153/83 in sitting, 134/77 after activity. Pt appears to be near baseline and safe to return home with family assist upon d/c. PT will follow to increase pt mobility while IP.    Anticipated Discharge Disposition (PT): home with assist                        "

## 2024-11-04 NOTE — NURSING NOTE
Massive blood transfusion    Unit # [id number]   Start 1010   Stop 1040    Unit #[id number]  Start 1040  Stop 1107    Unit # [id number]  Start 1107   Stop 1125    Unit # [id number]  Start 1125  Stop 1200    Unit # [id number]  Start 1148  Stop 1200    Unit # [id number]  Start 1148  Stop 1230    Unit# [id number]  Start 1235   Stop 1330    Unit # [id number]  Start 1246  Stop 1304    Unit # [id number]  Start 1220  Stop 1255    Unit # [id number]  Start 1255   Stop 1330    Unit #[id number]  Start 1215  Stop 1235      Vitals documented PRN throughout transfusion. NO REACTIONS NOTED.

## 2024-11-04 NOTE — PROGRESS NOTES
LOS: 2 days   Patient Care Team:  Tray Betts MD as PCP - General (Internal Medicine)      Subjective     Interval History:     Subjective: Patient with no new complaints.  Denies any abdominal pain or nausea/vomiting.  Tolerating clear liquids without difficulty.  Did have 1 bowel movement overnight with some dark blood present.      ROS:   No chest pain, shortness of breath, or cough.        Medication Review:     Current Facility-Administered Medications:     acetaminophen (TYLENOL) tablet 650 mg, 650 mg, Oral, Q4H PRN **OR** acetaminophen (TYLENOL) 160 MG/5ML oral solution 650 mg, 650 mg, Oral, Q4H PRN **OR** acetaminophen (TYLENOL) suppository 650 mg, 650 mg, Rectal, Q4H PRN, Jean Paul Mayers MD    amLODIPine (NORVASC) tablet 10 mg, 10 mg, Oral, Q24H, 10 mg at 11/04/24 0839 **AND** [Held by provider] valsartan (DIOVAN) tablet 320 mg, 320 mg, Oral, Q24H, Day, Imchelle G, APRN    [Held by provider] atorvastatin (LIPITOR) tablet 10 mg, 10 mg, Oral, Daily, Day, Michelle G, APRN    Calcium Replacement - Follow Nurse / BPA Driven Protocol, , Does not apply, PRN, Jean Paul Mayers MD    dextrose (D50W) (25 g/50 mL) IV injection 25 g, 25 g, Intravenous, Q15 Min PRN, Jean Paul Mayers MD    dextrose (GLUTOSE) oral gel 15 g, 15 g, Oral, Q15 Min PRN, Jean Paul Mayers MD    EPINEPHrine (ADRENALIN) injection, , , PRN, ALEX Yang MD, 1 mg at 11/03/24 1008    [Held by provider] gabapentin (NEURONTIN) capsule 600 mg, 600 mg, Oral, Q8H, Day, Michelle G, APRN    glucagon (GLUCAGEN) injection 1 mg, 1 mg, Subcutaneous, Q15 Min PRN, Jean Paul Mayers MD    [Held by provider] hydroCHLOROthiazide tablet 12.5 mg, 12.5 mg, Oral, Daily, Day, Michelle G, APRN    [Held by provider] HYDROcodone-acetaminophen (NORCO)  MG per tablet 1 tablet, 1 tablet, Oral, TID PRN, Michelle Man, APRN    Magnesium Standard Dose Replacement - Follow Nurse / BPA Driven Protocol, , Does not apply, PRN, Jean Paul Mayers MD    nitroglycerin  (NITROSTAT) SL tablet 0.4 mg, 0.4 mg, Sublingual, Q5 Min PRN, Jean Paul Mayers MD    ondansetron (ZOFRAN) injection 4 mg, 4 mg, Intravenous, Q6H PRN, Douglas Orona DO, 4 mg at 11/03/24 0945    pantoprazole (PROTONIX) injection 40 mg, 40 mg, Intravenous, Q12H, Jean Paul Mayers MD, 40 mg at 11/04/24 0839    Phosphorus Replacement - Follow Nurse / BPA Driven Protocol, , Does not apply, PRN, Jean Paul Mayers MD    potassium chloride (KLOR-CON M20) CR tablet 40 mEq, 40 mEq, Oral, Q4H, Cee Ortiz MD, 40 mEq at 11/04/24 0839    Potassium Replacement - Follow Nurse / BPA Driven Protocol, , Does not apply, PRNRiana Benjamin C, MD    sodium chloride 0.9 % bolus 2,000 mL, 2,000 mL, Intravenous, Once, Jean Paul Mayers MD    [COMPLETED] Insert Peripheral IV, , , Once **AND** sodium chloride 0.9 % flush 10 mL, 10 mL, Intravenous, PRN, William Combs MD    sodium chloride 0.9 % flush 10 mL, 10 mL, Intravenous, Q12H, Jean Paul Mayers MD, 10 mL at 11/04/24 0840    sodium chloride 0.9 % flush 10 mL, 10 mL, Intravenous, PRN, Jean Paul Mayers MD    sodium chloride 0.9 % flush 10 mL, 10 mL, Intravenous, Q12H, Day, Michelle G, APRN, 10 mL at 11/04/24 0840    sodium chloride 0.9 % flush 10 mL, 10 mL, Intravenous, Q12H, Day, Michelle G, APRN, 10 mL at 11/04/24 0840    sodium chloride 0.9 % flush 10 mL, 10 mL, Intravenous, Q12H, Day, Michelle G, APRN, 10 mL at 11/04/24 0840    sodium chloride 0.9 % flush 10 mL, 10 mL, Intravenous, PRN, Day, Michelle G, APRN    sodium chloride 0.9 % flush 20 mL, 20 mL, Intravenous, PRN, Day, Michelle G, APRN    sodium chloride 0.9 % infusion 40 mL, 40 mL, Intravenous, PRN, Jean Paul Mayers MD    sodium chloride 0.9 % infusion 40 mL, 40 mL, Intravenous, PRN, Michelle Man, KENNEDY      Objective     Vital Signs  Vitals:    11/04/24 0830 11/04/24 0839 11/04/24 0845 11/04/24 0900   BP:  157/92  164/91   Pulse: 97  99 94   Resp:       Temp:       TempSrc:       SpO2: 98%  100% 99%   Weight:       Height:            Physical Exam:     General Appearance:    Awake and alert, in no acute distress   Head:    Normocephalic, without obvious abnormality   Eyes:          Conjunctivae normal, anicteric sclera   Throat:   No oral lesions, no thrush, oral mucosa moist   Neck:   No adenopathy, supple, no JVD   Lungs:     respirations regular, even and unlabored   Abdomen:     Soft, non-tender, no rebound or guarding, non-distended   Rectal:     Deferred   Extremities:   No edema, no cyanosis   Skin:   No bruising or rash, no jaundice        Results Review:    CBC    Results from last 7 days   Lab Units 11/04/24  0535 11/03/24  2354 11/03/24  1757 11/03/24  1359 11/03/24  1201 11/03/24  0647 11/03/24  0356 11/02/24  2344 11/02/24  1852   WBC 10*3/mm3 12.93*  --   --  16.55*  --  13.23*  12.92* 12.99* 15.00* 13.88*   HEMOGLOBIN g/dL 9.9* 10.0* 10.4* 10.1*  --  7.1*  7.1* 6.8* 9.4* 11.1*   HEMOGLOBIN, POC g/dL  --   --   --   --  12.6  --   --   --   --    PLATELETS 10*3/mm3 152  --   --  155  --  265  252 270 323 315     CMP   Results from last 7 days   Lab Units 11/04/24  0535 11/03/24  1359 11/02/24  1852   SODIUM mmol/L 138 142 134*   POTASSIUM mmol/L 3.6 3.8 3.6   CHLORIDE mmol/L 105 107 98   CO2 mmol/L 27.0 26.6 24.5   BUN mg/dL 13 23 23   CREATININE mg/dL 0.87 1.11 1.23   GLUCOSE mg/dL 95 118* 123*   ALBUMIN g/dL 3.2* 3.4*  --    BILIRUBIN mg/dL 0.7 0.9  --    ALK PHOS U/L 50 48  --    AST (SGOT) U/L 19 18  --    ALT (SGPT) U/L 17 17  --    MAGNESIUM mg/dL 1.8 1.9  --    PHOSPHORUS mg/dL  --  2.5  --      Cr Clearance Estimated Creatinine Clearance: 95.8 mL/min (by C-G formula based on SCr of 0.87 mg/dL).  Coag   Results from last 7 days   Lab Units 11/02/24  1852   INR  1.02   APTT seconds 23.3*     HbA1C   Lab Results   Component Value Date    HGBA1C 5.5 12/24/2020     Results from last 7 days   Lab Units 11/02/24 1852   HSTROP T ng/L 9     Infection     UA      Microbiology Results (last 10 days)       ** No results found  for the last 240 hours. **          Imaging Results (Last 72 Hours)       Procedure Component Value Units Date/Time    CT Abdomen Pelvis Without Contrast [[id number]] Collected: 11/03/24 0208     Updated: 11/03/24 0216    Narrative:      CT ABDOMEN PELVIS WO CONTRAST    Date of Exam: 11/2/2024 11:31 PM EDT    Indication: hematochezia.    Comparison: None available.    Technique: Axial CT images were obtained of the abdomen and pelvis without the administration of contrast. Sagittal and coronal reconstructions were performed.  Automated exposure control and iterative reconstruction methods were used.      Findings:  Lung Bases:     The visualized lung bases and lower mediastinal structures are unremarkable.    Liver:  Liver is normal in size and CT density. No focal lesions.    Biliary/Gallbladder:    The gallbladder is surgically absent. The biliary tree is nondilated.    Spleen:  Spleen is normal in size and CT density.    Pancreas:    Pancreas is normal. There is no evidence of pancreatic mass or peripancreatic fluid.    Kidneys:    Kidneys are normal in size. There are no stones or hydronephrosis.    Adrenals:    Adrenal glands are unremarkable.    Retroperitoneal/Lymph Nodes/Vasculature:    No retroperitoneal adenopathy is identified.    Gastrointestinal/Mesentery:    The bowel loops are non-dilated without wall thickening or mass. There is diverticulosis without evidence of diverticulitis. The appendix appears within normal limits. No evidence of obstruction. No free air. No mesenteric fluid collections identified.    Bladder:    The bladder is normal.    Genital:     Unremarkable          Bony Structures:     Visualized bony structures are consistent with the patient's age.        Impression:      Impression:  Diverticulosis without diverticulitis. No acute abdominal or pelvic abnormality.        Electronically Signed: Jeffrey Pfeiffer MD    11/3/2024 2:14 AM EST    Workstation ID: [id number]    XR Chest 1 View  [[id number]] Collected: 11/02/24 1918     Updated: 11/02/24 1920    Narrative:      XR CHEST 1 VW    Date of Exam: 11/2/2024 7:01 PM EDT    Indication: syncope    Comparison: Coronary CT 2/27/2024    Findings:  Aortic atherosclerotic disease. Heart size normal. Negative for lobar consolidation, edema, effusion or pneumothorax. Degenerative related osseous changes. Cervical fusion hardware.      Impression:      Impression:  No active pulmonary process.      Electronically Signed: Joshua Perez MD    11/2/2024 7:18 PM EDT    Workstation ID: [id number]            Assessment & Plan     ASSESSMENT:  -Rectal bleeding -s/p colonoscopy 11/3  -Acute blood loss anemia  -Rheumatoid arthritis  -Hypertension  -Chronic neck pain    PLAN:  Patient is a 63-year-old male with history of rheumatoid arthritis who presented on 11/2 with complaints of rectal bleeding.  Patient underwent colonoscopy with polypectomy on 11/1 and developed large amounts of rectal bleeding following.  Initial hemoglobin on admission was 11.4 which dropped as low as 6.8 this admission.  Repeat colonoscopy yesterday.    Patient reports that he is feeling better.  1 bowel movement overnight which did contain a small amount of dark blood.  No abdominal pain or nausea/vomiting.  S/p repeat colonoscopy in 11/3 which showed an ulcer in the cecum consistent with recent polypectomy with visible vessel s/p epinephrine injection, APC, and Endo Clip placement x 3.  Hemoglobin stable at 9.9 from 10.0.  Continue to monitor H/H and transfuse as needed.  Will advance to healthy heart diet.  Avoid NSAIDs x 2 weeks.  Okay for discharge home later today from GI standpoint.  Follow-up in our office in 6 to 8 weeks.  GI will be available as inpatient as needed.      Electronically signed by KENNEDY Avendano, 11/04/24, 9:21 AM EST.

## 2024-11-04 NOTE — NURSING NOTE
Pt arrived to ICU with a SBP in the 60's. Pt was having bright red bloody BM's. Pt placed on levophed. Massive blood transfusion protocol initiated. Pt received 5 units PRBC 4 FFP 1 Plt 1 Cryo in the ICU. Emergent Colonoscopy completed, see note. Levo titrated off post procedure.     Vitals are now stable. Pt is now a PCU overflow.

## 2024-11-04 NOTE — PLAN OF CARE
Goal Outcome Evaluation:      Pt rested well throughout the night. X1 small dark bloody BM this shift. BP stable. Hgb stable, see results. 1g calcium given. VSS. Denies pain/discomfort at this time.

## 2024-11-05 ENCOUNTER — APPOINTMENT (OUTPATIENT)
Dept: CT IMAGING | Facility: HOSPITAL | Age: 64
End: 2024-11-05
Payer: MEDICARE

## 2024-11-05 LAB
ALBUMIN SERPL-MCNC: 3.6 G/DL (ref 3.5–5.2)
ALBUMIN SERPL-MCNC: 3.6 G/DL (ref 3.5–5.2)
ALBUMIN/GLOB SERPL: 1.7 G/DL
ALBUMIN/GLOB SERPL: 1.8 G/DL
ALP SERPL-CCNC: 57 U/L (ref 39–117)
ALP SERPL-CCNC: 68 U/L (ref 39–117)
ALT SERPL W P-5'-P-CCNC: 18 U/L (ref 1–41)
ALT SERPL W P-5'-P-CCNC: 18 U/L (ref 1–41)
ANION GAP SERPL CALCULATED.3IONS-SCNC: 9.6 MMOL/L (ref 5–15)
ANION GAP SERPL CALCULATED.3IONS-SCNC: 9.8 MMOL/L (ref 5–15)
AST SERPL-CCNC: 22 U/L (ref 1–40)
AST SERPL-CCNC: 23 U/L (ref 1–40)
BASOPHILS # BLD AUTO: 0.04 10*3/MM3 (ref 0–0.2)
BASOPHILS # BLD AUTO: 0.05 10*3/MM3 (ref 0–0.2)
BASOPHILS NFR BLD AUTO: 0.3 % (ref 0–1.5)
BASOPHILS NFR BLD AUTO: 0.6 % (ref 0–1.5)
BH BB BLOOD EXPIRATION DATE: NORMAL
BH BB BLOOD EXPIRATION DATE: NORMAL
BH BB BLOOD TYPE BARCODE: 5100
BH BB BLOOD TYPE BARCODE: NORMAL
BH BB DISPENSE STATUS: NORMAL
BH BB DISPENSE STATUS: NORMAL
BH BB PRODUCT CODE: NORMAL
BH BB PRODUCT CODE: NORMAL
BH BB UNIT NUMBER: NORMAL
BH BB UNIT NUMBER: NORMAL
BILIRUB SERPL-MCNC: 0.4 MG/DL (ref 0–1.2)
BILIRUB SERPL-MCNC: 0.7 MG/DL (ref 0–1.2)
BUN SERPL-MCNC: 10 MG/DL (ref 8–23)
BUN SERPL-MCNC: 9 MG/DL (ref 8–23)
BUN/CREAT SERPL: 10.9 (ref 7–25)
BUN/CREAT SERPL: 11.7 (ref 7–25)
CA-I SERPL ISE-MCNC: 1.1 MMOL/L (ref 1.15–1.3)
CA-I SERPL ISE-MCNC: 1.11 MMOL/L (ref 1.15–1.3)
CALCIUM SPEC-SCNC: 8.5 MG/DL (ref 8.6–10.5)
CALCIUM SPEC-SCNC: 8.5 MG/DL (ref 8.6–10.5)
CHLORIDE SERPL-SCNC: 101 MMOL/L (ref 98–107)
CHLORIDE SERPL-SCNC: 102 MMOL/L (ref 98–107)
CO2 SERPL-SCNC: 24.4 MMOL/L (ref 22–29)
CO2 SERPL-SCNC: 25.2 MMOL/L (ref 22–29)
CREAT SERPL-MCNC: 0.77 MG/DL (ref 0.76–1.27)
CREAT SERPL-MCNC: 0.92 MG/DL (ref 0.76–1.27)
CROSSMATCH INTERPRETATION: NORMAL
CROSSMATCH INTERPRETATION: NORMAL
DEPRECATED RDW RBC AUTO: 44.5 FL (ref 37–54)
DEPRECATED RDW RBC AUTO: 46.5 FL (ref 37–54)
EGFRCR SERPLBLD CKD-EPI 2021: 92.3 ML/MIN/1.73
EGFRCR SERPLBLD CKD-EPI 2021: 99.4 ML/MIN/1.73
EOSINOPHIL # BLD AUTO: 0.18 10*3/MM3 (ref 0–0.4)
EOSINOPHIL # BLD AUTO: 0.21 10*3/MM3 (ref 0–0.4)
EOSINOPHIL NFR BLD AUTO: 1.4 % (ref 0.3–6.2)
EOSINOPHIL NFR BLD AUTO: 2.6 % (ref 0.3–6.2)
ERYTHROCYTE [DISTWIDTH] IN BLOOD BY AUTOMATED COUNT: 13.6 % (ref 12.3–15.4)
ERYTHROCYTE [DISTWIDTH] IN BLOOD BY AUTOMATED COUNT: 14.3 % (ref 12.3–15.4)
GLOBULIN UR ELPH-MCNC: 2 GM/DL
GLOBULIN UR ELPH-MCNC: 2.1 GM/DL
GLUCOSE SERPL-MCNC: 146 MG/DL (ref 65–99)
GLUCOSE SERPL-MCNC: 96 MG/DL (ref 65–99)
HCT VFR BLD AUTO: 30.5 % (ref 37.5–51)
HCT VFR BLD AUTO: 31.5 % (ref 37.5–51)
HGB BLD-MCNC: 10.4 G/DL (ref 13–17.7)
HGB BLD-MCNC: 10.6 G/DL (ref 13–17.7)
IMM GRANULOCYTES # BLD AUTO: 0.07 10*3/MM3 (ref 0–0.05)
IMM GRANULOCYTES # BLD AUTO: 0.09 10*3/MM3 (ref 0–0.05)
IMM GRANULOCYTES NFR BLD AUTO: 0.5 % (ref 0–0.5)
IMM GRANULOCYTES NFR BLD AUTO: 1.1 % (ref 0–0.5)
LYMPHOCYTES # BLD AUTO: 1.2 10*3/MM3 (ref 0.7–3.1)
LYMPHOCYTES # BLD AUTO: 1.83 10*3/MM3 (ref 0.7–3.1)
LYMPHOCYTES NFR BLD AUTO: 13.8 % (ref 19.6–45.3)
LYMPHOCYTES NFR BLD AUTO: 14.6 % (ref 19.6–45.3)
MAGNESIUM SERPL-MCNC: 1.8 MG/DL (ref 1.6–2.4)
MAGNESIUM SERPL-MCNC: 2 MG/DL (ref 1.6–2.4)
MCH RBC QN AUTO: 30.2 PG (ref 26.6–33)
MCH RBC QN AUTO: 30.3 PG (ref 26.6–33)
MCHC RBC AUTO-ENTMCNC: 33.7 G/DL (ref 31.5–35.7)
MCHC RBC AUTO-ENTMCNC: 34.1 G/DL (ref 31.5–35.7)
MCV RBC AUTO: 88.9 FL (ref 79–97)
MCV RBC AUTO: 89.7 FL (ref 79–97)
MONOCYTES # BLD AUTO: 1.07 10*3/MM3 (ref 0.1–0.9)
MONOCYTES # BLD AUTO: 1.16 10*3/MM3 (ref 0.1–0.9)
MONOCYTES NFR BLD AUTO: 13 % (ref 5–12)
MONOCYTES NFR BLD AUTO: 8.8 % (ref 5–12)
NEUTROPHILS NFR BLD AUTO: 5.59 10*3/MM3 (ref 1.7–7)
NEUTROPHILS NFR BLD AUTO: 68.1 % (ref 42.7–76)
NEUTROPHILS NFR BLD AUTO: 75.2 % (ref 42.7–76)
NEUTROPHILS NFR BLD AUTO: 9.94 10*3/MM3 (ref 1.7–7)
NRBC BLD AUTO-RTO: 0 /100 WBC (ref 0–0.2)
NRBC BLD AUTO-RTO: 0 /100 WBC (ref 0–0.2)
PLATELET # BLD AUTO: 159 10*3/MM3 (ref 140–450)
PLATELET # BLD AUTO: 178 10*3/MM3 (ref 140–450)
PMV BLD AUTO: 10 FL (ref 6–12)
PMV BLD AUTO: 10.3 FL (ref 6–12)
POTASSIUM SERPL-SCNC: 4 MMOL/L (ref 3.5–5.2)
POTASSIUM SERPL-SCNC: 4 MMOL/L (ref 3.5–5.2)
PROT SERPL-MCNC: 5.6 G/DL (ref 6–8.5)
PROT SERPL-MCNC: 5.7 G/DL (ref 6–8.5)
RBC # BLD AUTO: 3.43 10*6/MM3 (ref 4.14–5.8)
RBC # BLD AUTO: 3.51 10*6/MM3 (ref 4.14–5.8)
SODIUM SERPL-SCNC: 136 MMOL/L (ref 136–145)
SODIUM SERPL-SCNC: 136 MMOL/L (ref 136–145)
UNIT  ABO: NORMAL
UNIT  ABO: NORMAL
UNIT  RH: NORMAL
UNIT  RH: NORMAL
WBC NRBC COR # BLD AUTO: 13.22 10*3/MM3 (ref 3.4–10.8)
WBC NRBC COR # BLD AUTO: 8.21 10*3/MM3 (ref 3.4–10.8)

## 2024-11-05 PROCEDURE — 97165 OT EVAL LOW COMPLEX 30 MIN: CPT

## 2024-11-05 PROCEDURE — 74176 CT ABD & PELVIS W/O CONTRAST: CPT

## 2024-11-05 PROCEDURE — 82330 ASSAY OF CALCIUM: CPT | Performed by: NURSE PRACTITIONER

## 2024-11-05 PROCEDURE — 80053 COMPREHEN METABOLIC PANEL: CPT | Performed by: NURSE PRACTITIONER

## 2024-11-05 PROCEDURE — 83735 ASSAY OF MAGNESIUM: CPT | Performed by: NURSE PRACTITIONER

## 2024-11-05 PROCEDURE — 25010000002 CEFTRIAXONE PER 250 MG: Performed by: HOSPITALIST

## 2024-11-05 PROCEDURE — 97116 GAIT TRAINING THERAPY: CPT

## 2024-11-05 PROCEDURE — 85025 COMPLETE CBC W/AUTO DIFF WBC: CPT | Performed by: NURSE PRACTITIONER

## 2024-11-05 RX ORDER — LEVOFLOXACIN 750 MG/1
750 TABLET, FILM COATED ORAL EVERY 24 HOURS
Status: DISCONTINUED | OUTPATIENT
Start: 2024-11-05 | End: 2024-11-05

## 2024-11-05 RX ORDER — METRONIDAZOLE 500 MG/1
500 TABLET ORAL EVERY 8 HOURS SCHEDULED
Status: DISCONTINUED | OUTPATIENT
Start: 2024-11-05 | End: 2024-11-07 | Stop reason: HOSPADM

## 2024-11-05 RX ADMIN — PANTOPRAZOLE SODIUM 40 MG: 40 INJECTION, POWDER, FOR SOLUTION INTRAVENOUS at 09:23

## 2024-11-05 RX ADMIN — ACETAMINOPHEN 650 MG: 325 TABLET, FILM COATED ORAL at 20:46

## 2024-11-05 RX ADMIN — METRONIDAZOLE 500 MG: 500 TABLET ORAL at 14:28

## 2024-11-05 RX ADMIN — GABAPENTIN 600 MG: 300 CAPSULE ORAL at 06:04

## 2024-11-05 RX ADMIN — VALSARTAN 320 MG: 80 TABLET, FILM COATED ORAL at 09:23

## 2024-11-05 RX ADMIN — AMLODIPINE BESYLATE 10 MG: 5 TABLET ORAL at 09:23

## 2024-11-05 RX ADMIN — Medication 10 ML: at 20:48

## 2024-11-05 RX ADMIN — CEFTRIAXONE 2000 MG: 2 INJECTION, POWDER, FOR SOLUTION INTRAMUSCULAR; INTRAVENOUS at 14:27

## 2024-11-05 RX ADMIN — METRONIDAZOLE 500 MG: 500 TABLET ORAL at 20:48

## 2024-11-05 RX ADMIN — PANTOPRAZOLE SODIUM 40 MG: 40 INJECTION, POWDER, FOR SOLUTION INTRAVENOUS at 20:46

## 2024-11-05 RX ADMIN — Medication 10 ML: at 09:23

## 2024-11-05 RX ADMIN — ACETAMINOPHEN 650 MG: 325 TABLET, FILM COATED ORAL at 06:06

## 2024-11-05 RX ADMIN — GABAPENTIN 600 MG: 300 CAPSULE ORAL at 20:48

## 2024-11-05 RX ADMIN — ATORVASTATIN CALCIUM 10 MG: 10 TABLET, FILM COATED ORAL at 09:23

## 2024-11-05 RX ADMIN — GABAPENTIN 600 MG: 300 CAPSULE ORAL at 14:28

## 2024-11-05 NOTE — PLAN OF CARE
Goal Outcome Evaluation:  Plan of Care Reviewed With: patient, spouse           Outcome Evaluation: 63-year-old male with history of rheumatoid arthritis who presented on 11/2/24 with complaints of rectal bleeding. Patient underwent colonoscopy with polypectomy on 11/1 and developed large amounts of rectal bleeding following. Initial hemoglobin on admission was 11.4 which dropped as low as 6.8 this admission.  Rapid response was called on 11/3 at 0500. Patient was transferred to ICU and placed on pressors for hemorrhagic shock, received a total of 5 units PRBCs. S/p repeat colonoscopy in 11/3 which showed an ulcer in the cecum consistent with recent polypectomy with visible vessel s/p epinephrine injection, APC, and Endo Clip placement x 3. This date, patient feeling much better. He is A&Ox4 and reports that he and his wife live in a [facility] with 1 [address]. He is retired, though still very active and is raising a 5 and 7 year old who are his grandchildren, but now are adopted children. He does not use AD. This date, he appears to be back near his baseline. Bed mobility performed with IND as were sit to stands and functional ambulation in the hallway without AD. Pt slightly winded, but he reports this to be his normal and O2 sats 97%. OT will sign off, as pt no longer has functional deficits.    Anticipated Discharge Disposition (OT): home

## 2024-11-05 NOTE — PLAN OF CARE
Goal Outcome Evaluation:           Progress: improving  Outcome Evaluation: Pt was tranferred from ICU. Possible discharge home today

## 2024-11-05 NOTE — PLAN OF CARE
Assessment: Anup Fernandez presents with functional mobility impairments which indicate the need for skilled intervention. Pt balance with ambulation is much improved today. No c/o of dizziness or lightheadedness and all vitals WNL. Pt appears to be at baseline at this time. PT will sign off.    Plan/Recommendations:   If medically appropriate, No ongoing therapy recommended post-acute care. No therapy needs. Pt requires no DME at discharge.

## 2024-11-05 NOTE — THERAPY TREATMENT NOTE
Subjective: Pt agreeable to therapeutic plan of care.    Objective:     Precautions - N/A    Bed mobility - Independent  Transfers - Independent  Ambulation - 300 feet Independent    Vitals: WNL    Pain: 2 VAS   Location: LUQ  Intervention for pain: Increased Activity and Therapeutic Presence    Education: Provided education on the importance of mobility in the acute care setting, Verbal/Tactile Cues, Transfer Training, and Gait Training    Assessment: Anup Fernandez presents with functional mobility impairments which indicate the need for skilled intervention. Pt balance with ambulation is much improved today. No c/o of dizziness or lightheadedness and all vitals WNL. Pt appears to be at baseline at this time. PT will sign off.    Plan/Recommendations:   If medically appropriate, No ongoing therapy recommended post-acute care. No therapy needs. Pt requires no DME at discharge.     Pt desires Home at discharge. Pt cooperative; agreeable to therapeutic recommendations and plan of care.     Basic Mobility 6-click:  Rollin = Total, A lot = 2, A little = 3; 4 = None  Supine>Sit:   1 = Total, A lot = 2, A little = 3; 4 = None   Sit>Stand with arms:  1 = Total, A lot = 2, A little = 3; 4 = None  Bed>Chair:   1 = Total, A lot = 2, A little = 3; 4 = None  Ambulate in room:  1 = Total, A lot = 2, A little = 3; 4 = None  3-5 Steps with railin = Total, A lot = 2, A little = 3; 4 = None  Score: 24    Modified Karen: N/A = No pre-op stroke/TIA    Post-Tx Position: Supine with HOB Elevated and Call light and personal items within reach  PPE: gloves    Therapy Charges for Today       Code Description Service Date Service Provider Modifiers Qty    25511389789 HC PT EVAL MOD COMPLEXITY 4 2024 Gloria Baziz, PT GP 1    [id number] HC GAIT TRAINING EA 15 MIN 2024 Gloria Bazzi, PT GP 1           PT Charges       Row Name 24 1259             Time Calculation    Start Time 1214  -AH       Stop Time 1224  -      Time Calculation (min) 10 min  -      PT Received On 11/05/24  -         Time Calculation- PT    Total Timed Code Minutes- PT 10 minute(s)  -                User Key  (r) = Recorded By, (t) = Taken By, (c) = Cosigned By      Initials Name Provider Type     Gloria Bazzi, PT Physical Therapist

## 2024-11-05 NOTE — PROGRESS NOTES
[facility] MEDICINE SERVICE  DAILY PROGRESS NOTE    NAME: Anup Fernandez  : 1960  MRN: [id number]      LOS: 3 days     PROVIDER OF SERVICE: Genaro Barillas MD    Chief Complaint: Syncope    Subjective:     Interval History:  History taken from: patient chart family RN    New left upper quadrant abdominal pain        Review of Systems:   Review of Systems  All negative except as above  Objective:     Vital Signs  Temp:  [98 °F (36.7 °C)-99.4 °F (37.4 °C)] 98.5 °F (36.9 °C)  Heart Rate:  [] 91  Resp:  [23-26] 26  BP: (127-156)/(74-91) 127/85   Body mass index is 25.31 kg/m².    Physical Exam  Physical Exam  AOx3 NAD  RRR S1 and S2 audible  Lungs with fair air entry  Abdomen with left upper quadrant abdominal pain.  Positive guarding no rigidity bowel sounds positive    Diagnostic Data    Results from last 7 days   Lab Units 24  0126   WBC 10*3/mm3 13.22*   HEMOGLOBIN g/dL 10.6*   HEMATOCRIT % 31.5*   PLATELETS 10*3/mm3 159   GLUCOSE mg/dL 96   CREATININE mg/dL 0.77   BUN mg/dL 9   SODIUM mmol/L 136   POTASSIUM mmol/L 4.0   AST (SGOT) U/L 22   ALT (SGPT) U/L 18   ALK PHOS U/L 57   BILIRUBIN mg/dL 0.7   ANION GAP mmol/L 9.6       CT Abdomen Pelvis Stone Protocol    Result Date: 2024  Impression: Acute uncomplicated colonic diverticulitis within the mid descending colon. No abscess, free air or bowel obstruction. Electronically Signed: Joshua Perez MD  2024 10:33 AM EST  Workstation ID: [id number]       I reviewed the patient's new clinical results.  I reviewed the patient's new imaging results and agree with the interpretation.    Assessment/Plan:     Active and Resolved Problems  Active Hospital Problems    Diagnosis  POA    **Syncope [R55]  Unknown    Hemorrhage of colon following colonoscopy [K91.840]  Yes    Rheumatoid arthritis involving hand [M06.9]  Yes      Resolved Hospital Problems   No resolved problems to display.       #Hemorrhagic shock resolved due to acute blood  loss anemia secondary to post polypectomy bleeding  Seen by GI underwent colonoscopy 11/3 which showed ulcer in the cecum consistent with recent polypectomy, visible vessel with active bleeding bright red blood.  Status post epinephrine injection, APC coagulation and 3 endoclips placement    Advance diet as tolerated  Monitor H&H currently stable  Avoid NSAIDs for at least 2 weeks  Transfuse PRBCs as needed to keep globin more than 7      #Acute diverticulitis  CT patient with new onset left upper quadrant abdominal pain CT abdomen and pelvis 11/5 reveals mid descending colonic diverticulitis  Allergy to penicillin noted  Start on levofloxacin and Flagyl  Monitor pain     #hypertension  Continue amlodipine/valsartan  Resume hydrochlorothiazide as BP permits    #Hyperlipidemia  Continue statins        VTE Prophylaxis:  Mechanical VTE prophylaxis orders are present.             Disposition Planning:     Barriers to Discharge: Medical workup  Anticipated Date of Discharge: 11/6  Place of Discharge: Home      Time: 45 minutes     Code Status and Medical Interventions: CPR (Attempt to Resuscitate); Full Support   Ordered at: 11/03/24 0343     Code Status (Patient has no pulse and is not breathing):    CPR (Attempt to Resuscitate)     Medical Interventions (Patient has pulse or is breathing):    Full Support       Signature: Electronically signed by Genaro Barillas MD, 11/05/24, 11:16 EST.  [facility] Hospitalist Team

## 2024-11-05 NOTE — THERAPY EVALUATION
Patient Name: Anup Fernandez  : 1960    MRN: [id number]                              Today's Date: 2024       Admit Date: 2024    Visit Dx:     ICD-10-CM ICD-9-CM   1. Syncope, unspecified syncope type  R55 780.2   2. Rectal bleeding  K62.5 569.3     Patient Active Problem List   Diagnosis    Hemorrhage of colon following colonoscopy    Rheumatoid arthritis involving hand    Syncope     History reviewed. No pertinent past medical history.  Past Surgical History:   Procedure Laterality Date    COLONOSCOPY      COLONOSCOPY N/A 11/3/2024    Procedure: COLONOSCOPY WITH SCLEROTHERAPY, ARGON PLASMA COAGULATION, AND ENDOSCOPIC CLIPPING X 3 OF POST POLYPECTOMY SITE AT BEDSIDE;  Surgeon: ALEX Yang MD;  Location: [facility] ENDOSCOPY;  Service: Gastroenterology;  Laterality: N/A;  POST POLYPECTOMY CECAL BLEED WITH EPI, APC, AND CLIPS      General Information       Row Name 24 1254          OT Time and Intention    Document Type evaluation  -LS     Mode of Treatment occupational therapy  -LS     Patient Effort excellent  -LS       Row Name 24 1254          General Information    Patient Profile Reviewed yes  -LS     Prior Level of Function independent:;ADL's;driving;all household mobility  -LS     Barriers to Rehab none identified  -LS       Row Name 24 1254          Living Environment    People in Home child(tito), dependent;spouse  -LS       Row Name 24 1254          Home Main Entrance    Number of Stairs, Main Entrance one  -LS     Stair Railings, Main Entrance none  -LS       Row Name 24 1254          Stairs Within Home, Primary    Number of Stairs, Within Home, Primary none  -LS       Row Name 24 1254          Cognition    Orientation Status (Cognition) oriented x 4  -LS               User Key  (r) = Recorded By, (t) = Taken By, (c) = Cosigned By      Initials Name Provider Type    Brandon Bowden OT Occupational Therapist                     Mobility/ADL's        Row Name 11/05/24 1254          Bed Mobility    Bed Mobility bed mobility (all) activities  -     All Activities, Chase (Bed Mobility) independent  -     Assistive Device (Bed Mobility) bed rails;head of bed elevated  -       Row Name 11/05/24 1254          Transfers    Transfers sit-stand transfer  -       Row Name 11/05/24 1254          Sit-Stand Transfer    Sit-Stand Chase (Transfers) independent  -       Row Name 11/05/24 1254          Functional Mobility    Functional Mobility- Ind. Level independent  -     Patient was able to Ambulate yes  -       Row Name 11/05/24 1254          Activities of Daily Living    BADL Assessment/Intervention other (see comments)  Pt independent for ADLs this date  -               User Key  (r) = Recorded By, (t) = Taken By, (c) = Cosigned By      Initials Name Provider Type    Brandon Bowden OT Occupational Therapist                   Obj/Interventions       Row Name 11/05/24 1301          Sensory Assessment (Somatosensory)    Sensory Assessment (Somatosensory) sensation intact  -       Row Name 11/05/24 1301          Vision Assessment/Intervention    Visual Impairment/Limitations WFL  -       Row Name 11/05/24 1301          Range of Motion Comprehensive    General Range of Motion no range of motion deficits identified  -       Row Name 11/05/24 1301          Strength Comprehensive (MMT)    General Manual Muscle Testing (MMT) Assessment no strength deficits identified  -       Row Name 11/05/24 1301          Balance    Balance Assessment sitting static balance;sitting dynamic balance;standing static balance;standing dynamic balance  -LS     Static Sitting Balance independent  -LS     Dynamic Sitting Balance independent  -LS     Position, Sitting Balance unsupported;sitting edge of bed  -     Static Standing Balance independent  -LS     Dynamic Standing Balance independent  -LS     Position/Device Used, Standing Balance unsupported  -                User Key  (r) = Recorded By, (t) = Taken By, (c) = Cosigned By      Initials Name Provider Type    Brandon Bowden OT Occupational Therapist                   Goals/Plan    No documentation.                  Clinical Impression       Row Name 11/05/24 1301          Pain Assessment    Pretreatment Pain Rating 2/10  -LS     Posttreatment Pain Rating 2/10  -LS     Pain Location abdomen  -LS       Row Name 11/05/24 1301          Plan of Care Review    Plan of Care Reviewed With patient;spouse  -     Outcome Evaluation 63-year-old male with history of rheumatoid arthritis who presented on 11/2/24 with complaints of rectal bleeding. Patient underwent colonoscopy with polypectomy on 11/1 and developed large amounts of rectal bleeding following. Initial hemoglobin on admission was 11.4 which dropped as low as 6.8 this admission.  Rapid response was called on 11/3 at 0500. Patient was transferred to ICU and placed on pressors for hemorrhagic shock, received a total of 5 units PRBCs. S/p repeat colonoscopy in 11/3 which showed an ulcer in the cecum consistent with recent polypectomy with visible vessel s/p epinephrine injection, APC, and Endo Clip placement x 3. This date, patient feeling much better. He is A&Ox4 and reports that he and his wife live in a [facility] with 1 [facility]. He is retired, though still very active and is raising a 5 and 7 year old who are his grandchildren, but now are adopted children. He does not use AD. This date, he appears to be back near his baseline. Bed mobility performed with IND as were sit to stands and functional ambulation in the hallway without AD. Pt slightly winded, but he reports this to be his normal and O2 sats 97%. OT will sign off, as pt no longer has functional deficits.  -       Row Name 11/05/24 1301          Therapy Assessment/Plan (OT)    Criteria for Skilled Therapeutic Interventions Met (OT) no;no problems identified which require skilled intervention  -      Therapy Frequency (OT) evaluation only  -LS     Predicted Duration of Therapy Intervention (OT) eval only  -LS       Row Name 11/05/24 1301          Therapy Plan Review/Discharge Plan (OT)    Anticipated Discharge Disposition (OT) home  -LS       Row Name 11/05/24 1301          Vital Signs    O2 Delivery Pre Treatment room air  -LS     O2 Delivery Intra Treatment room air  -LS     O2 Delivery Post Treatment room air  -LS     Pre Patient Position Supine  -LS     Intra Patient Position Standing  -LS     Post Patient Position Supine  -LS       Row Name 11/05/24 1301          Positioning and Restraints    Pre-Treatment Position in bed  -LS     Post Treatment Position bed  -LS     In Bed notified nsg;fowlers;call light within reach;encouraged to call for assist;patient within staff view  -LS               User Key  (r) = Recorded By, (t) = Taken By, (c) = Cosigned By      Initials Name Provider Type    Brandon Bowden, KING Occupational Therapist                   Outcome Measures       Row Name 11/05/24 1308          How much help from another is currently needed...    Putting on and taking off regular lower body clothing? 4  -LS     Bathing (including washing, rinsing, and drying) 4  -LS     Toileting (which includes using toilet bed pan or urinal) 4  -LS     Putting on and taking off regular upper body clothing 4  -LS     Taking care of personal grooming (such as brushing teeth) 4  -LS     Eating meals 4  -LS     AM-PAC 6 Clicks Score (OT) 24  -LS       Row Name 11/05/24 0800          How much help from another person do you currently need...    Turning from your back to your side while in flat bed without using bedrails? 4  -KB     Moving from lying on back to sitting on the side of a flat bed without bedrails? 4  -KB     Moving to and from a bed to a chair (including a wheelchair)? 4  -KB     Standing up from a chair using your arms (e.g., wheelchair, bedside chair)? 4  -KB     Climbing 3-5 steps with a railing? 4   -KB     To walk in hospital room? 4  -KB     AM-PAC 6 Clicks Score (PT) 24  -KB       Row Name 11/05/24 1308          Functional Assessment    Outcome Measure Options AM-PAC 6 Clicks Daily Activity (OT)  -               User Key  (r) = Recorded By, (t) = Taken By, (c) = Cosigned By      Initials Name Provider Type    Thomas Thompson RN Registered Nurse    Brandon Bowden OT Occupational Therapist                    Occupational Therapy Education       Title: PT OT SLP Therapies (Done)       Topic: Occupational Therapy (Done)       Point: ADL training (Done)       Description:   Instruct learner(s) on proper safety adaptation and remediation techniques during self care or transfers.   Instruct in proper use of assistive devices.                  Learning Progress Summary            Patient Acceptance, E,TB, VU by  at 11/5/2024 1309                      Point: Precautions (Done)       Description:   Instruct learner(s) on prescribed precautions during self-care and functional transfers.                  Learning Progress Summary            Patient Acceptance, E,TB, VU by  at 11/5/2024 1309                      Point: Body mechanics (Done)       Description:   Instruct learner(s) on proper positioning and spine alignment during self-care, functional mobility activities and/or exercises.                  Learning Progress Summary            Patient Acceptance, E,TB, VU by  at 11/5/2024 1309                                      User Key       Initials Effective Dates Name Provider Type [facility] 09/22/22 -  Brandon Gottlieb OT Occupational Therapist OT                  OT Recommendation and Plan  Therapy Frequency (OT): evaluation only  Plan of Care Review  Plan of Care Reviewed With: patient, spouse  Outcome Evaluation: 63-year-old male with history of rheumatoid arthritis who presented on 11/2/24 with complaints of rectal bleeding. Patient underwent colonoscopy with polypectomy on 11/1 and  developed large amounts of rectal bleeding following. Initial hemoglobin on admission was 11.4 which dropped as low as 6.8 this admission.  Rapid response was called on 11/3 at 0500. Patient was transferred to ICU and placed on pressors for hemorrhagic shock, received a total of 5 units PRBCs. S/p repeat colonoscopy in 11/3 which showed an ulcer in the cecum consistent with recent polypectomy with visible vessel s/p epinephrine injection, APC, and Endo Clip placement x 3. This date, patient feeling much better. He is A&Ox4 and reports that he and his wife live in a [facility] with 1 [address]. He is retired, though still very active and is raising a 5 and 7 year old who are his grandchildren, but now are adopted children. He does not use AD. This date, he appears to be back near his baseline. Bed mobility performed with IND as were sit to stands and functional ambulation in the hallway without AD. Pt slightly winded, but he reports this to be his normal and O2 sats 97%. OT will sign off, as pt no longer has functional deficits.     Time Calculation:         Time Calculation- OT       Row Name 11/05/24 1309             Time Calculation- OT    OT Start Time 0835  -LS      OT Stop Time 0855  -LS      OT Time Calculation (min) 20 min  -LS      OT Received On 11/05/24  -LS         Untimed Charges    OT Eval/Re-eval Minutes 20  -LS         Total Minutes    Untimed Charges Total Minutes 20  -LS       Total Minutes 20  -LS                User Key  (r) = Recorded By, (t) = Taken By, (c) = Cosigned By      Initials Name Provider Type     Brandon Gottlieb OT Occupational Therapist                  Therapy Charges for Today       Code Description Service Date Service Provider Modifiers Qty    [id number]  OT EVAL LOW COMPLEXITY 4 11/5/2024 Brandon Gottlieb OT GO 1                 Brandon Gottlieb OT  11/5/2024

## 2024-11-06 LAB
ANION GAP SERPL CALCULATED.3IONS-SCNC: 10.9 MMOL/L (ref 5–15)
BUN SERPL-MCNC: 9 MG/DL (ref 8–23)
BUN/CREAT SERPL: 11 (ref 7–25)
CALCIUM SPEC-SCNC: 9.1 MG/DL (ref 8.6–10.5)
CHLORIDE SERPL-SCNC: 101 MMOL/L (ref 98–107)
CO2 SERPL-SCNC: 25.1 MMOL/L (ref 22–29)
CREAT SERPL-MCNC: 0.82 MG/DL (ref 0.76–1.27)
DEPRECATED RDW RBC AUTO: 45.6 FL (ref 37–54)
EGFRCR SERPLBLD CKD-EPI 2021: 97.5 ML/MIN/1.73
ERYTHROCYTE [DISTWIDTH] IN BLOOD BY AUTOMATED COUNT: 13.5 % (ref 12.3–15.4)
GLUCOSE SERPL-MCNC: 112 MG/DL (ref 65–99)
HCT VFR BLD AUTO: 35.5 % (ref 37.5–51)
HGB BLD-MCNC: 11.7 G/DL (ref 13–17.7)
MCH RBC QN AUTO: 30.4 PG (ref 26.6–33)
MCHC RBC AUTO-ENTMCNC: 33 G/DL (ref 31.5–35.7)
MCV RBC AUTO: 92.2 FL (ref 79–97)
PLATELET # BLD AUTO: 188 10*3/MM3 (ref 140–450)
PMV BLD AUTO: 9.9 FL (ref 6–12)
POTASSIUM SERPL-SCNC: 3.8 MMOL/L (ref 3.5–5.2)
RBC # BLD AUTO: 3.85 10*6/MM3 (ref 4.14–5.8)
SODIUM SERPL-SCNC: 137 MMOL/L (ref 136–145)
THROMBIN TIME: 19 SEC (ref 0–23)
WBC NRBC COR # BLD AUTO: 5.92 10*3/MM3 (ref 3.4–10.8)

## 2024-11-06 PROCEDURE — 80048 BASIC METABOLIC PNL TOTAL CA: CPT | Performed by: HOSPITALIST

## 2024-11-06 PROCEDURE — 25010000002 CEFTRIAXONE PER 250 MG: Performed by: HOSPITALIST

## 2024-11-06 PROCEDURE — 85027 COMPLETE CBC AUTOMATED: CPT | Performed by: HOSPITALIST

## 2024-11-06 RX ORDER — PANTOPRAZOLE SODIUM 40 MG/1
40 TABLET, DELAYED RELEASE ORAL
Status: DISCONTINUED | OUTPATIENT
Start: 2024-11-07 | End: 2024-11-07 | Stop reason: HOSPADM

## 2024-11-06 RX ADMIN — METRONIDAZOLE 500 MG: 500 TABLET ORAL at 05:08

## 2024-11-06 RX ADMIN — Medication 10 ML: at 21:18

## 2024-11-06 RX ADMIN — AMLODIPINE BESYLATE 10 MG: 5 TABLET ORAL at 08:03

## 2024-11-06 RX ADMIN — Medication 10 ML: at 08:04

## 2024-11-06 RX ADMIN — PANTOPRAZOLE SODIUM 40 MG: 40 INJECTION, POWDER, FOR SOLUTION INTRAVENOUS at 08:03

## 2024-11-06 RX ADMIN — GABAPENTIN 600 MG: 300 CAPSULE ORAL at 21:17

## 2024-11-06 RX ADMIN — METRONIDAZOLE 500 MG: 500 TABLET ORAL at 21:17

## 2024-11-06 RX ADMIN — ACETAMINOPHEN 650 MG: 325 TABLET, FILM COATED ORAL at 02:38

## 2024-11-06 RX ADMIN — METRONIDAZOLE 500 MG: 500 TABLET ORAL at 14:12

## 2024-11-06 RX ADMIN — CEFTRIAXONE 2000 MG: 2 INJECTION, POWDER, FOR SOLUTION INTRAMUSCULAR; INTRAVENOUS at 08:03

## 2024-11-06 RX ADMIN — GABAPENTIN 600 MG: 300 CAPSULE ORAL at 14:12

## 2024-11-06 RX ADMIN — ATORVASTATIN CALCIUM 10 MG: 10 TABLET, FILM COATED ORAL at 08:04

## 2024-11-06 RX ADMIN — GABAPENTIN 600 MG: 300 CAPSULE ORAL at 05:08

## 2024-11-06 RX ADMIN — VALSARTAN 320 MG: 80 TABLET, FILM COATED ORAL at 08:03

## 2024-11-06 NOTE — CASE MANAGEMENT/SOCIAL WORK
Continued Stay Note  [facility]     Patient Name: Anup Fernandez  MRN: [id number]  Today's Date: 11/6/2024    Admit Date: 11/2/2024    Plan: Plan to return home with spouse.   Discharge Plan       Row Name 11/06/24 1017       Plan    Plan Plan to return home with spouse.    Plan Comments DC Barriers: monitor HGB, plan to dc home 11/6, GI following.                 Expected Discharge Date and Time       Expected Discharge Date Expected Discharge Time    Nov 8, 2024             YISEL Yepez RN  ICU/CVU   O: [phone]  C: [phone]  [email or web address]

## 2024-11-06 NOTE — PROGRESS NOTES
[facility] MEDICINE SERVICE  DAILY PROGRESS NOTE    NAME: Anup Fernandez  : 1960  MRN: [id number]      LOS: 4 days     PROVIDER OF SERVICE: Timothy Duane Brammell, MD    Chief Complaint: Syncope    Subjective:     Interval History:  History taken from: patient    Patient still with some left-sided abdominal pain.  Not had significant bowel movement.  Tolerating his diet without issue.  No further bleeding.  Denies any other additional acute issues.        Review of Systems:   Review of Systems   All other systems reviewed and are negative.      Objective:     Vital Signs  Temp:  [97.5 °F (36.4 °C)-98.9 °F (37.2 °C)] 97.5 °F (36.4 °C)  Heart Rate:  [71-98] 78  Resp:  [11-22] 12  BP: (126-152)/(75-90) 129/85   Body mass index is 25.31 kg/m².    Physical Exam  Physical Exam  Vitals reviewed.   Constitutional:       Appearance: Normal appearance.   HENT:      Head: Normocephalic.   Cardiovascular:      Rate and Rhythm: Normal rate and regular rhythm.   Pulmonary:      Effort: Pulmonary effort is normal.      Breath sounds: Normal breath sounds.   Abdominal:      General: Bowel sounds are normal.      Palpations: Abdomen is soft.      Tenderness: There is abdominal tenderness.      Comments: Mild left-sided tenderness compared to right.   Musculoskeletal:         General: No swelling.   Neurological:      Mental Status: He is alert. Mental status is at baseline.            Diagnostic Data    Results from last 7 days   Lab Units 24  0858 24  2237   WBC 10*3/mm3 5.92 8.21   HEMOGLOBIN g/dL 11.7* 10.4*   HEMATOCRIT % 35.5* 30.5*   PLATELETS 10*3/mm3 188 178   GLUCOSE mg/dL 112* 146*   CREATININE mg/dL 0.82 0.92   BUN mg/dL 9 10   SODIUM mmol/L 137 136   POTASSIUM mmol/L 3.8 4.0   AST (SGOT) U/L  --  23   ALT (SGPT) U/L  --  18   ALK PHOS U/L  --  68   BILIRUBIN mg/dL  --  0.4   ANION GAP mmol/L 10.9 9.8       CT Abdomen Pelvis Stone Protocol    Result Date: 2024  Impression: Acute  uncomplicated colonic diverticulitis within the mid descending colon. No abscess, free air or bowel obstruction. Electronically Signed: Joshua Perez MD  11/5/2024 10:33 AM EST  Workstation ID: [id number]           Assessment    Acute gastrointestinal hemorrhage.  Recent colonoscopy and polypectomy  Acute blood loss anemia  Left-sided abdominal pain  Shock  Diverticulitis       Plan: Ongoing IV antibiotics at present.  Monitor bowel function for bowel movement.  Follow-up CBC.  Probable discharge home tomorrow with stable lab and no worsened abdominal issues.      Active and Resolved Problems  Active Hospital Problems    Diagnosis  POA    **Syncope [R55]  Unknown    Hemorrhage of colon following colonoscopy [K91.840]  Yes    Rheumatoid arthritis involving hand [M06.9]  Yes      Resolved Hospital Problems   No resolved problems to display.           VTE Prophylaxis:  Mechanical VTE prophylaxis orders are present.             Disposition Planning:     Barriers to Discharge:abdominal pain  Anticipated Date of Discharge: 11/7  Place of Discharge: home      Time: 30 minutes     Code Status and Medical Interventions: CPR (Attempt to Resuscitate); Full Support   Ordered at: 11/03/24 0343     Code Status (Patient has no pulse and is not breathing):    CPR (Attempt to Resuscitate)     Medical Interventions (Patient has pulse or is breathing):    Full Support       Signature: Electronically signed by Timothy Duane Brammell, MD, 11/06/24, 14:35 EST.  [facility] Hospitalist Team

## 2024-11-06 NOTE — PLAN OF CARE
Goal Outcome Evaluation:               Small BM today, still c/o of left upper quadrant pain, about a 4/10. Adl ib, hemoglobin 11.7

## 2024-11-06 NOTE — NURSING NOTE
Report called to receiving nurse; updated on POC; questions answered; labs acquired prior to transfer; pt sent w/ belongings and all current concerns addressed

## 2024-11-07 ENCOUNTER — READMISSION MANAGEMENT (OUTPATIENT)
Dept: CALL CENTER | Facility: HOSPITAL | Age: 64
End: 2024-11-07
Payer: MEDICARE

## 2024-11-07 VITALS
HEIGHT: 70 IN | BODY MASS INDEX: 25.25 KG/M2 | DIASTOLIC BLOOD PRESSURE: 84 MMHG | TEMPERATURE: 98.6 F | OXYGEN SATURATION: 97 % | HEART RATE: 88 BPM | RESPIRATION RATE: 19 BRPM | WEIGHT: 176.37 LBS | SYSTOLIC BLOOD PRESSURE: 135 MMHG

## 2024-11-07 LAB
ANION GAP SERPL CALCULATED.3IONS-SCNC: 10.8 MMOL/L (ref 5–15)
BASOPHILS # BLD AUTO: 0.06 10*3/MM3 (ref 0–0.2)
BASOPHILS NFR BLD AUTO: 0.7 % (ref 0–1.5)
BUN SERPL-MCNC: 11 MG/DL (ref 8–23)
BUN/CREAT SERPL: 12 (ref 7–25)
CALCIUM SPEC-SCNC: 9.3 MG/DL (ref 8.6–10.5)
CHLORIDE SERPL-SCNC: 99 MMOL/L (ref 98–107)
CO2 SERPL-SCNC: 26.2 MMOL/L (ref 22–29)
CREAT SERPL-MCNC: 0.92 MG/DL (ref 0.76–1.27)
DEPRECATED RDW RBC AUTO: 45 FL (ref 37–54)
EGFRCR SERPLBLD CKD-EPI 2021: 92.3 ML/MIN/1.73
EOSINOPHIL # BLD AUTO: 0.27 10*3/MM3 (ref 0–0.4)
EOSINOPHIL NFR BLD AUTO: 3.2 % (ref 0.3–6.2)
ERYTHROCYTE [DISTWIDTH] IN BLOOD BY AUTOMATED COUNT: 13.4 % (ref 12.3–15.4)
GLUCOSE SERPL-MCNC: 105 MG/DL (ref 65–99)
HCT VFR BLD AUTO: 36.7 % (ref 37.5–51)
HGB BLD-MCNC: 12.3 G/DL (ref 13–17.7)
IMM GRANULOCYTES # BLD AUTO: 0.07 10*3/MM3 (ref 0–0.05)
IMM GRANULOCYTES NFR BLD AUTO: 0.8 % (ref 0–0.5)
LYMPHOCYTES # BLD AUTO: 2.1 10*3/MM3 (ref 0.7–3.1)
LYMPHOCYTES NFR BLD AUTO: 24.5 % (ref 19.6–45.3)
MCH RBC QN AUTO: 30.4 PG (ref 26.6–33)
MCHC RBC AUTO-ENTMCNC: 33.5 G/DL (ref 31.5–35.7)
MCV RBC AUTO: 90.6 FL (ref 79–97)
MONOCYTES # BLD AUTO: 0.96 10*3/MM3 (ref 0.1–0.9)
MONOCYTES NFR BLD AUTO: 11.2 % (ref 5–12)
NEUTROPHILS NFR BLD AUTO: 5.1 10*3/MM3 (ref 1.7–7)
NEUTROPHILS NFR BLD AUTO: 59.6 % (ref 42.7–76)
NRBC BLD AUTO-RTO: 0 /100 WBC (ref 0–0.2)
PLATELET # BLD AUTO: 282 10*3/MM3 (ref 140–450)
PMV BLD AUTO: 10.1 FL (ref 6–12)
POTASSIUM SERPL-SCNC: 3.8 MMOL/L (ref 3.5–5.2)
PROCALCITONIN SERPL-MCNC: 0.05 NG/ML (ref 0–0.25)
RBC # BLD AUTO: 4.05 10*6/MM3 (ref 4.14–5.8)
SODIUM SERPL-SCNC: 136 MMOL/L (ref 136–145)
WBC NRBC COR # BLD AUTO: 8.56 10*3/MM3 (ref 3.4–10.8)

## 2024-11-07 PROCEDURE — 25010000002 CEFTRIAXONE PER 250 MG: Performed by: HOSPITALIST

## 2024-11-07 PROCEDURE — 85025 COMPLETE CBC W/AUTO DIFF WBC: CPT | Performed by: HOSPITALIST

## 2024-11-07 PROCEDURE — 84145 PROCALCITONIN (PCT): CPT | Performed by: HOSPITALIST

## 2024-11-07 PROCEDURE — 80048 BASIC METABOLIC PNL TOTAL CA: CPT | Performed by: HOSPITALIST

## 2024-11-07 RX ORDER — DIPHENOXYLATE HYDROCHLORIDE AND ATROPINE SULFATE 2.5; .025 MG/1; MG/1
1 TABLET ORAL DAILY
Qty: 30 TABLET | Refills: 0 | Status: SHIPPED | OUTPATIENT
Start: 2024-11-07

## 2024-11-07 RX ORDER — METRONIDAZOLE 500 MG/1
500 TABLET ORAL EVERY 8 HOURS SCHEDULED
Qty: 15 TABLET | Refills: 0 | Status: SHIPPED | OUTPATIENT
Start: 2024-11-07 | End: 2024-11-12

## 2024-11-07 RX ORDER — LEVOFLOXACIN 500 MG/1
500 TABLET, FILM COATED ORAL DAILY
Qty: 5 TABLET | Refills: 0 | Status: SHIPPED | OUTPATIENT
Start: 2024-11-07

## 2024-11-07 RX ADMIN — GABAPENTIN 600 MG: 300 CAPSULE ORAL at 05:03

## 2024-11-07 RX ADMIN — Medication 10 ML: at 08:38

## 2024-11-07 RX ADMIN — AMLODIPINE BESYLATE 10 MG: 5 TABLET ORAL at 08:37

## 2024-11-07 RX ADMIN — METRONIDAZOLE 500 MG: 500 TABLET ORAL at 05:03

## 2024-11-07 RX ADMIN — ATORVASTATIN CALCIUM 10 MG: 10 TABLET, FILM COATED ORAL at 08:37

## 2024-11-07 RX ADMIN — PANTOPRAZOLE SODIUM 40 MG: 40 TABLET, DELAYED RELEASE ORAL at 05:03

## 2024-11-07 RX ADMIN — CEFTRIAXONE 2000 MG: 2 INJECTION, POWDER, FOR SOLUTION INTRAMUSCULAR; INTRAVENOUS at 08:37

## 2024-11-07 RX ADMIN — VALSARTAN 320 MG: 80 TABLET, FILM COATED ORAL at 08:37

## 2024-11-07 NOTE — DISCHARGE SUMMARY
[facility] Medicine Services  Discharge Summary    Date of Service: 2024  Patient Name: Anup Fernandez  : 1960  MRN: [id number]    Date of Admission: 2024  Discharge Diagnosis:     Acute gastrointestinal hemorrhage.  Recent colonoscopy and polypectomy  Acute blood loss anemia  Hypovolemic shock  Diverticulitis  Acute renal injury      Date of Discharge: 2024  Primary Care Physician: Tray Betts MD      Presenting Problem:   Syncope [R55]  Rectal bleeding [K62.5]  Syncope, unspecified syncope type [R55]    Active and Resolved Hospital Problems:  Active Hospital Problems    Diagnosis POA    **Syncope [R55] Unknown    Hemorrhage of colon following colonoscopy [K91.840] Yes    Rheumatoid arthritis involving hand [M06.9] Yes      Resolved Hospital Problems   No resolved problems to display.         Hospital Course     HPI:  As per history and physical of 2024      Hospital Course:  This is a pleasant 63-year-old white male who presented with issues as discussed in history and physical.  He had no true temperature spike during his hospitalization.  Initial blood pressures required fluid resuscitation for manage.  Oxygenation was maintained on room air.  He had no microbiology studies.  Chest x-ray showed no acute process.  Initial CT scan showed diverticulosis without any evidence of abnormality otherwise follow-up CT scan showed some left-sided colonic changes consistent with diverticulitis.  He displayed anemia on presentation that worsened requiring transfusion.  Iron saturation was normal.  He had some mild renal insufficiency on presentation that improved with volume repletion.  Liver functions unremarkable.  He was noted to have his recent colonoscopy and polypectomy.  He was seen in follow-up by gastroenterology and subsequently had endoscopy performed with noted findings and subsequent intervention.  He was advanced as to his diet.  He did have his noted  abdominal discomfort on his left side and changes consistent with diverticulitis and received antibiotic therapy with some improvement prior to discharge.  It had spontaneous return of bowel function without shraddha hematochezia.  Hemoglobin hematocrit remained stable.  It was thought that he could be discharged home and followed up by primary care as an outpatient.            Day of Discharge     Vital Signs:  Temp:  [97.5 °F (36.4 °C)-99.2 °F (37.3 °C)] 98.6 °F (37 °C)  Heart Rate:  [87-91] 88  Resp:  [13-24] 19  BP: (119-143)/(66-84) 135/84    Physical Exam:  Physical Exam  Vitals reviewed.   Constitutional:       Appearance: Normal appearance.   HENT:      Head: Normocephalic and atraumatic.   Cardiovascular:      Rate and Rhythm: Normal rate and regular rhythm.   Pulmonary:      Effort: Pulmonary effort is normal.      Breath sounds: Normal breath sounds.   Abdominal:      General: Bowel sounds are normal.      Palpations: Abdomen is soft.      Tenderness: There is no abdominal tenderness.   Musculoskeletal:         General: No swelling.   Neurological:      Mental Status: He is alert. Mental status is at baseline.            Pertinent  and/or Most Recent Results     LAB RESULTS:      Lab 11/07/24  0512 11/06/24  0858 11/05/24  2237 11/05/24  0126 11/04/24  2126 11/04/24  0535 11/03/24  1757 11/03/24  1359 11/03/24  1201 11/02/24  2344 11/02/24  1852   WBC 8.56 5.92 8.21 13.22*  --  12.93*  --  16.55*  --    < > 13.88*   HEMOGLOBIN 12.3* 11.7* 10.4* 10.6* 10.4* 9.9*   < > 10.1*  --    < > 11.1*   HEMOGLOBIN, POC  --   --   --   --   --   --   --   --  12.6  --   --    HEMATOCRIT 36.7* 35.5* 30.5* 31.5* 30.8* 29.7*   < > 28.7*  --    < > 31.7*   HEMATOCRIT POC  --   --   --   --   --   --   --   --  37*  --   --    PLATELETS 282 188 178 159  --  152  --  155  --    < > 315   NEUTROS ABS 5.10  --  5.59 9.94*  --  9.81*  --  13.29*  --    < > 10.01*   IMMATURE GRANS (ABS) 0.07*  --  0.09* 0.07*  --  0.10*  --  0.23*   --    < > 0.14*   LYMPHS ABS 2.10  --  1.20 1.83  --  1.79  --  1.62  --    < > 2.58   MONOS ABS 0.96*  --  1.07* 1.16*  --  1.09*  --  1.38*  --    < > 0.95*   EOS ABS 0.27  --  0.21 0.18  --  0.10  --  0.01  --    < > 0.15   MCV 90.6 92.2 88.9 89.7  --  87.1  --  86.7  --    < > 88.3   PROCALCITONIN 0.05  --   --   --   --   --   --   --   --   --   --    LACTATE  --   --   --   --   --   --   --   --  1.5  --   --    PROTIME  --   --   --   --   --   --   --   --   --   --  11.1   APTT  --   --   --   --   --   --   --   --   --   --  23.3*    < > = values in this interval not displayed.         Lab 11/07/24  0512 11/06/24  0858 11/05/24  2237 11/05/24  0126 11/04/24  1617 11/04/24  0535 11/03/24  2354 11/03/24  1359   SODIUM 136 137 136 136  --  138  --  142   POTASSIUM 3.8 3.8 4.0 4.0 4.2 3.6  --  3.8   CHLORIDE 99 101 101 102  --  105  --  107   CO2 26.2 25.1 25.2 24.4  --  27.0  --  26.6   ANION GAP 10.8 10.9 9.8 9.6  --  6.0  --  8.4   BUN 11 9 10 9  --  13  --  23   CREATININE 0.92 0.82 0.92 0.77  --  0.87  --  1.11   EGFR 92.3 97.5 92.3 99.4  --  95.8  --  73.7   GLUCOSE 105* 112* 146* 96  --  95  --  118*   CALCIUM 9.3 9.1 8.5* 8.5*  --  8.5*  --  8.8   IONIZED CALCIUM  --   --  1.11* 1.10*  --  1.13* 1.11* 1.11*   MAGNESIUM  --   --  2.0 1.8  --  1.8  --  1.9   PHOSPHORUS  --   --   --   --   --   --   --  2.5         Lab 11/05/24  2237 11/05/24  0126 11/04/24  0535 11/03/24  1359   TOTAL PROTEIN 5.7* 5.6* 4.9* 5.0*   ALBUMIN 3.6 3.6 3.2* 3.4*   GLOBULIN 2.1 2.0 1.7 1.6   ALT (SGPT) 18 18 17 17   AST (SGOT) 23 22 19 18   BILIRUBIN 0.4 0.7 0.7 0.9   ALK PHOS 68 57 50 48         Lab 11/02/24  1852   HSTROP T 9   PROTIME 11.1   INR 1.02             Lab 11/03/24  1359 11/02/24  1852   IRON 203*  --    IRON SATURATION (TSAT) 66*  --    TIBC 308  --    TRANSFERRIN 207  --    ABO TYPING  --  B   RH TYPING  --  Positive   ANTIBODY SCREEN  --  Negative         Lab 11/03/24  1201   PH, ARTERIAL 7.364   PCO2,  ARTERIAL 34.4*   PO2 ART 79.4*   O2 SATURATION ART 95.3   FIO2 21   HCO3 ART 19.6*   BASE EXCESS ART -5.1*     Brief Urine Lab Results       None          Microbiology Results (last 10 days)       ** No results found for the last 240 hours. **            CT Abdomen Pelvis Stone Protocol    Result Date: 11/5/2024  Impression: Impression: Acute uncomplicated colonic diverticulitis within the mid descending colon. No abscess, free air or bowel obstruction. Electronically Signed: Joshua Perez MD  11/5/2024 10:33 AM EST  Workstation ID: [id number]    CT Abdomen Pelvis Without Contrast    Result Date: 11/3/2024  Impression: Impression: Diverticulosis without diverticulitis. No acute abdominal or pelvic abnormality. Electronically Signed: Jeffrey Pfeiffer MD  11/3/2024 2:14 AM EST  Workstation ID: [id number]    XR Chest 1 View    Result Date: 11/2/2024  Impression: Impression: No active pulmonary process. Electronically Signed: Joshua Perez MD  11/2/2024 7:18 PM EDT  Workstation ID: [id number]                 Labs Pending at Discharge:  Pending Results       None            Procedures Performed  Procedure(s):  COLONOSCOPY WITH SCLEROTHERAPY, ARGON PLASMA COAGULATION, AND ENDOSCOPIC CLIPPING X 3 OF POST POLYPECTOMY SITE AT BEDSIDE         Consults:   Consults       Date and Time Order Name Status Description    11/3/2024  3:14 PM Inpatient Hospitalist Consult Completed               Discharge Details        Discharge Medications        New Medications        Instructions Start Date   levoFLOXacin 500 MG tablet  Commonly known as: LEVAQUIN   500 mg, Oral, Daily      metroNIDAZOLE 500 MG tablet  Commonly known as: FLAGYL   500 mg, Oral, Every 8 Hours Scheduled      multivitamin tablet tablet  Generic drug: multivitamin   1 tablet, Oral, Daily             Continue These Medications        Instructions Start Date   Exforge  MG per tablet  Generic drug: amLODIPine-valsartan   1 tablet, Daily      fish oil 1000 MG capsule  capsule   1,000 mg, Daily With Breakfast      fluticasone 50 MCG/ACT nasal spray  Commonly known as: FLONASE   2 sprays, Daily      gabapentin 600 MG tablet  Commonly known as: NEURONTIN   600 mg, 4 Times Daily      hydroCHLOROthiazide 12.5 MG tablet   12.5 mg, Daily      HYDROcodone-acetaminophen  MG per tablet  Commonly known as: NORCO   1 tablet, 3 Times Daily PRN      Loratadine 10 MG capsule   10 mg, Daily      lovastatin 20 MG 24 hr tablet  Commonly known as: ALTOPREV   20 mg, Nightly      omeprazole 20 MG capsule  Commonly known as: priLOSEC   20 mg, Daily      sildenafil 100 MG tablet  Commonly known as: VIAGRA   100 mg, Daily PRN      tobramycin-dexAMETHasone 0.3-0.1 % ophthalmic suspension  Commonly known as: TOBRADEX   3-4 drops, Every 4 Hours While Awake             Stop These Medications      oxaprozin 600 MG tablet  Commonly known as: DAYPRO              Allergies   Allergen Reactions    Penicillins Hives         Discharge Disposition: home  Home or Self Care    Diet:  Hospital:No active diet order        Discharge Activity:   Activity Instructions    Follow with GI 6-8 weeks             CODE STATUS:  Code Status and Medical Interventions: CPR (Attempt to Resuscitate); Full Support   Ordered at: 11/03/24 0343     Code Status (Patient has no pulse and is not breathing):    CPR (Attempt to Resuscitate)     Medical Interventions (Patient has pulse or is breathing):    Full Support         No future appointments.    Additional Instructions for the Follow-ups that You Need to Schedule       Discharge Follow-up with PCP   As directed       Currently Documented PCP:    Tray Betts MD    PCP Phone Number:    [phone]     Follow Up Details: one week                Time spent on Discharge including face to face service:  45 minutes    Signature: Electronically signed by Timothy Duane Brammell, MD, 11/07/24, 13:56 EST.  [facility] Hospitalist Team

## 2024-11-07 NOTE — CASE MANAGEMENT/SOCIAL WORK
Case Management Discharge Note      Final Note: Home         Selected Continued Care - Discharged on 11/7/2024 Admission date: 11/2/2024 - Discharge disposition: Home or Self Care       Transportation Services  Private: Car    Final Discharge Disposition Code: 01 - home or self-care    YISEL Yepez RN  ICU/CVU   O: [phone]  C: [phone]  [email or web address]

## 2024-11-08 NOTE — OUTREACH NOTE
Prep Survey      Flowsheet Row Responses   [facility] patient discharged from? [facility]   Is LACE score < 7 ? No   Eligibility Readm Mgmt   Discharge diagnosis Syncope/Hemorrhage of colon following colonoscopy   Does the patient have one of the following disease processes/diagnoses(primary or secondary)? Other   Does the patient have Home health ordered? No   Is there a DME ordered? No   Prep survey completed? Yes            DIANDRA RICH - Registered Nurse

## 2024-11-12 ENCOUNTER — READMISSION MANAGEMENT (OUTPATIENT)
Dept: CALL CENTER | Facility: HOSPITAL | Age: 64
End: 2024-11-12
Payer: MEDICARE

## 2024-11-12 NOTE — OUTREACH NOTE
Medical Week 1 Survey      Flowsheet Row Responses   [facility] patient discharged from? [facility]   Does the patient have one of the following disease processes/diagnoses(primary or secondary)? Other   Week 1 attempt successful? Yes   Call start time 0920   Call end time 0921   Discharge diagnosis Syncope/Hemorrhage of colon following colonoscopy   Person spoke with today (if not patient) and relationship Wife   Meds reviewed with patient/caregiver? Yes   Is the patient having any side effects they believe may be caused by any medication additions or changes? No   Does the patient have all medications ordered at discharge? Yes   Is the patient taking all medications as directed (includes completed medication regime)? Yes   Does the patient have a primary care provider?  Yes   Does the patient have an appointment with their PCP within 7 days of discharge? Yes   Has the patient kept scheduled appointments due by today? N/A   Has home health visited the patient within 72 hours of discharge? N/A   Psychosocial issues? No   Did the patient receive a copy of their discharge instructions? Yes   Nursing interventions Reviewed instructions with patient   What is the patient's perception of their health status since discharge? Improving   Is the patient/caregiver able to teach back the hierarchy of who to call/visit for symptoms/problems? PCP, Specialist, Home health nurse, Urgent Care, ED, 911 Yes   If the patient is a current smoker, are they able to teach back resources for cessation? Not a smoker   Week 1 call completed? Yes   Graduated Yes   Wrap up additional comments Pts wife states pt is doing well and has Dr f/u appt tomorrow.   Call end time 0921            Madyson MARTINEZ - Registered Nurse

## (undated) DEVICE — PK ENDO GI 50

## (undated) DEVICE — FIAPC® PROBE W/ FILTER 2200 A OD 2.3MM/6.9FR; L 2.2M/7.2FT: Brand: ERBE

## (undated) DEVICE — THE CARR-LOCKE INJECTION NEEDLE IS A SINGLE USE, DISPOSABLE, FLEXIBLE SHEATH INJECTION NEEDLE USED FOR THE INJECTION OF VARIOUS TYPES OF MEDIA THROUGH FLEXIBLE ENDOSCOPES.